# Patient Record
Sex: MALE | Race: WHITE | Employment: OTHER | URBAN - METROPOLITAN AREA
[De-identification: names, ages, dates, MRNs, and addresses within clinical notes are randomized per-mention and may not be internally consistent; named-entity substitution may affect disease eponyms.]

---

## 2017-01-04 ENCOUNTER — HOSPITAL ENCOUNTER (OUTPATIENT)
Dept: OTHER | Age: 82
Discharge: HOME | End: 2017-01-04
Attending: FAMILY MEDICINE | Admitting: FAMILY MEDICINE

## 2017-01-04 DIAGNOSIS — G89.29 CHRONIC MIDLINE THORACIC BACK PAIN: ICD-10-CM

## 2017-01-04 DIAGNOSIS — M76.892 ENTHESOPATHY OF HIP REGION ON BOTH SIDES: ICD-10-CM

## 2017-01-04 DIAGNOSIS — M54.9 CHRONIC NECK AND BACK PAIN: ICD-10-CM

## 2017-01-04 DIAGNOSIS — M54.6 CHRONIC MIDLINE THORACIC BACK PAIN: ICD-10-CM

## 2017-01-04 DIAGNOSIS — M54.2 CHRONIC NECK AND BACK PAIN: ICD-10-CM

## 2017-01-04 DIAGNOSIS — M16.10 PRIMARY LOCALIZED OSTEOARTHROSIS, PELVIC REGION AND THIGH: ICD-10-CM

## 2017-01-04 DIAGNOSIS — M54.50 CHRONIC MIDLINE LOW BACK PAIN WITHOUT SCIATICA: ICD-10-CM

## 2017-01-04 DIAGNOSIS — G89.29 CHRONIC MIDLINE LOW BACK PAIN WITHOUT SCIATICA: ICD-10-CM

## 2017-01-04 DIAGNOSIS — G89.29 CHRONIC NECK AND BACK PAIN: ICD-10-CM

## 2017-01-04 DIAGNOSIS — M76.891 ENTHESOPATHY OF HIP REGION ON BOTH SIDES: ICD-10-CM

## 2017-01-09 ENCOUNTER — CARE COORDINATION (OUTPATIENT)
Dept: CARE COORDINATION | Age: 82
End: 2017-01-09

## 2017-02-14 ENCOUNTER — OFFICE VISIT (OUTPATIENT)
Dept: FAMILY MEDICINE CLINIC | Age: 82
End: 2017-02-14

## 2017-02-14 ENCOUNTER — HOSPITAL ENCOUNTER (OUTPATIENT)
Dept: OTHER | Age: 82
Discharge: HOME | End: 2017-02-14
Attending: FAMILY MEDICINE | Admitting: FAMILY MEDICINE

## 2017-02-14 VITALS
BODY MASS INDEX: 30.75 KG/M2 | OXYGEN SATURATION: 97 % | SYSTOLIC BLOOD PRESSURE: 126 MMHG | DIASTOLIC BLOOD PRESSURE: 78 MMHG | HEIGHT: 72 IN | HEART RATE: 59 BPM | WEIGHT: 227 LBS

## 2017-02-14 DIAGNOSIS — R10.9 ACUTE RIGHT FLANK PAIN: Primary | ICD-10-CM
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
## 2017-02-14 DIAGNOSIS — Z93.3 S/P COLOSTOMY (HCC): ICD-10-CM

## 2017-02-14 DIAGNOSIS — R10.9 ACUTE RIGHT FLANK PAIN: ICD-10-CM

## 2017-02-14 DIAGNOSIS — K43.5 PARASTOMAL HERNIA WITHOUT OBSTRUCTION OR GANGRENE: ICD-10-CM

## 2017-02-14 PROCEDURE — 1123F ACP DISCUSS/DSCN MKR DOCD: CPT | Performed by: FAMILY MEDICINE

## 2017-02-14 PROCEDURE — G8417 CALC BMI ABV UP PARAM F/U: HCPCS | Performed by: FAMILY MEDICINE

## 2017-02-14 PROCEDURE — G8427 DOCREV CUR MEDS BY ELIG CLIN: HCPCS | Performed by: FAMILY MEDICINE

## 2017-02-14 PROCEDURE — 1036F TOBACCO NON-USER: CPT | Performed by: FAMILY MEDICINE

## 2017-02-14 PROCEDURE — G8484 FLU IMMUNIZE NO ADMIN: HCPCS | Performed by: FAMILY MEDICINE

## 2017-02-14 PROCEDURE — 4040F PNEUMOC VAC/ADMIN/RCVD: CPT | Performed by: FAMILY MEDICINE

## 2017-02-14 PROCEDURE — 99214 OFFICE O/P EST MOD 30 MIN: CPT | Performed by: FAMILY MEDICINE

## 2017-02-14 RX ORDER — FUROSEMIDE 40 MG/1
40 TABLET ORAL DAILY PRN
Qty: 90 TABLET | Refills: 2 | Status: SHIPPED | OUTPATIENT
Start: 2017-02-14 | End: 2017-12-13 | Stop reason: SDUPTHER

## 2017-02-21 ENCOUNTER — TELEPHONE (OUTPATIENT)
Dept: FAMILY MEDICINE CLINIC | Age: 82
End: 2017-02-21

## 2017-03-02 ENCOUNTER — HOSPITAL ENCOUNTER (OUTPATIENT)
Dept: CT IMAGING | Age: 82
Discharge: HOME | End: 2017-03-02
Attending: INTERNAL MEDICINE | Admitting: INTERNAL MEDICINE

## 2017-03-02 DIAGNOSIS — C78.01 SECONDARY MALIGNANCY OF RIGHT LUNG (HCC): ICD-10-CM

## 2017-03-02 DIAGNOSIS — C78.01 SECONDARY MALIGNANT NEOPLASM OF RIGHT LUNG (HCC): ICD-10-CM

## 2017-03-02 DIAGNOSIS — C20 RECTAL CARCINOMA (HCC): ICD-10-CM

## 2017-03-07 ENCOUNTER — CARE COORDINATION (OUTPATIENT)
Dept: CARE COORDINATION | Age: 82
End: 2017-03-07

## 2017-03-09 RX ORDER — FAMOTIDINE 20 MG/1
TABLET, FILM COATED ORAL
Qty: 180 TABLET | Refills: 1 | Status: SHIPPED | OUTPATIENT
Start: 2017-03-09 | End: 2017-08-30 | Stop reason: SDUPTHER

## 2017-04-19 ENCOUNTER — CARE COORDINATION (OUTPATIENT)
Dept: CARE COORDINATION | Age: 82
End: 2017-04-19

## 2017-08-30 RX ORDER — FAMOTIDINE 20 MG/1
TABLET, FILM COATED ORAL
Qty: 180 TABLET | Refills: 1 | Status: SHIPPED | OUTPATIENT
Start: 2017-08-30 | End: 2018-02-28 | Stop reason: SDUPTHER

## 2017-09-11 ENCOUNTER — OFFICE VISIT (OUTPATIENT)
Dept: FAMILY MEDICINE CLINIC | Age: 82
End: 2017-09-11

## 2017-09-11 VITALS
HEART RATE: 100 BPM | BODY MASS INDEX: 30.34 KG/M2 | HEIGHT: 72 IN | WEIGHT: 224 LBS | SYSTOLIC BLOOD PRESSURE: 126 MMHG | OXYGEN SATURATION: 94 % | DIASTOLIC BLOOD PRESSURE: 74 MMHG

## 2017-09-11 DIAGNOSIS — G62.0 CHEMOTHERAPY-INDUCED NEUROPATHY (HCC): ICD-10-CM

## 2017-09-11 DIAGNOSIS — Z23 FLU VACCINE NEED: ICD-10-CM

## 2017-09-11 DIAGNOSIS — T45.1X5A CHEMOTHERAPY-INDUCED NEUROPATHY (HCC): ICD-10-CM

## 2017-09-11 DIAGNOSIS — Z85.048 PERSONAL HISTORY OF RECTAL CANCER: ICD-10-CM

## 2017-09-11 DIAGNOSIS — R39.198 DIFFICULTY URINATING: ICD-10-CM

## 2017-09-11 DIAGNOSIS — I10 ESSENTIAL HYPERTENSION: ICD-10-CM

## 2017-09-11 DIAGNOSIS — N40.1 BENIGN PROSTATIC HYPERPLASIA WITH LOWER URINARY TRACT SYMPTOMS, UNSPECIFIED MORPHOLOGY: ICD-10-CM

## 2017-09-11 DIAGNOSIS — C91.10 CLL (CHRONIC LYMPHOCYTIC LEUKEMIA) (HCC): ICD-10-CM

## 2017-09-11 DIAGNOSIS — C78.01 SECONDARY MALIGNANCY OF RIGHT LUNG (HCC): ICD-10-CM

## 2017-09-11 DIAGNOSIS — Z01.818 PREOP EXAMINATION: Primary | ICD-10-CM

## 2017-09-11 PROCEDURE — G8417 CALC BMI ABV UP PARAM F/U: HCPCS | Performed by: FAMILY MEDICINE

## 2017-09-11 PROCEDURE — 90662 IIV NO PRSV INCREASED AG IM: CPT | Performed by: FAMILY MEDICINE

## 2017-09-11 PROCEDURE — 99214 OFFICE O/P EST MOD 30 MIN: CPT | Performed by: FAMILY MEDICINE

## 2017-09-11 PROCEDURE — 1123F ACP DISCUSS/DSCN MKR DOCD: CPT | Performed by: FAMILY MEDICINE

## 2017-09-11 PROCEDURE — 4040F PNEUMOC VAC/ADMIN/RCVD: CPT | Performed by: FAMILY MEDICINE

## 2017-09-11 PROCEDURE — G8427 DOCREV CUR MEDS BY ELIG CLIN: HCPCS | Performed by: FAMILY MEDICINE

## 2017-09-11 PROCEDURE — 1036F TOBACCO NON-USER: CPT | Performed by: FAMILY MEDICINE

## 2017-09-11 PROCEDURE — G0008 ADMIN INFLUENZA VIRUS VAC: HCPCS | Performed by: FAMILY MEDICINE

## 2017-09-11 PROCEDURE — 93000 ELECTROCARDIOGRAM COMPLETE: CPT | Performed by: FAMILY MEDICINE

## 2017-09-14 ENCOUNTER — HOSPITAL ENCOUNTER (OUTPATIENT)
Dept: CT IMAGING | Age: 82
Discharge: HOME | End: 2017-09-14
Attending: INTERNAL MEDICINE | Admitting: INTERNAL MEDICINE

## 2017-09-14 DIAGNOSIS — C78.01 SECONDARY MALIGNANCY OF RIGHT LUNG (HCC): ICD-10-CM

## 2017-09-14 DIAGNOSIS — C20 RECTAL CARCINOMA (HCC): ICD-10-CM

## 2017-09-14 DIAGNOSIS — C20 MALIGNANT NEOPLASM OF RECTUM (HCC): ICD-10-CM

## 2017-09-14 LAB
CREAT SERPL-MCNC: 0.9 MG/DL (ref 0.8–1.3)
GFR AFRICAN AMERICAN: >60
GFR NON-AFRICAN AMERICAN: >60

## 2017-09-21 ENCOUNTER — HOSPITAL ENCOUNTER (OUTPATIENT)
Dept: OTHER | Age: 82
Discharge: HOME | End: 2017-09-21
Attending: INTERNAL MEDICINE | Admitting: INTERNAL MEDICINE

## 2017-09-21 DIAGNOSIS — M25.551 RIGHT HIP PAIN: ICD-10-CM

## 2017-12-07 ENCOUNTER — OFFICE VISIT (OUTPATIENT)
Dept: FAMILY MEDICINE CLINIC | Age: 82
End: 2017-12-07

## 2017-12-07 VITALS
BODY MASS INDEX: 30.2 KG/M2 | HEART RATE: 59 BPM | DIASTOLIC BLOOD PRESSURE: 80 MMHG | HEIGHT: 72 IN | OXYGEN SATURATION: 93 % | WEIGHT: 223 LBS | SYSTOLIC BLOOD PRESSURE: 126 MMHG

## 2017-12-07 DIAGNOSIS — Z00.00 ROUTINE GENERAL MEDICAL EXAMINATION AT A HEALTH CARE FACILITY: Primary | ICD-10-CM

## 2017-12-07 DIAGNOSIS — M25.551 CHRONIC RIGHT HIP PAIN: ICD-10-CM

## 2017-12-07 DIAGNOSIS — M54.6 CHRONIC MIDLINE THORACIC BACK PAIN: ICD-10-CM

## 2017-12-07 DIAGNOSIS — G89.29 CHRONIC MIDLINE THORACIC BACK PAIN: ICD-10-CM

## 2017-12-07 DIAGNOSIS — G89.29 CHRONIC RIGHT HIP PAIN: ICD-10-CM

## 2017-12-07 DIAGNOSIS — G89.29 CHRONIC BILATERAL LOW BACK PAIN WITH BILATERAL SCIATICA: ICD-10-CM

## 2017-12-07 DIAGNOSIS — G89.29 CHRONIC NECK PAIN: ICD-10-CM

## 2017-12-07 DIAGNOSIS — M54.41 CHRONIC BILATERAL LOW BACK PAIN WITH BILATERAL SCIATICA: ICD-10-CM

## 2017-12-07 DIAGNOSIS — G89.29 CHRONIC LEFT HIP PAIN: ICD-10-CM

## 2017-12-07 DIAGNOSIS — M54.42 CHRONIC BILATERAL LOW BACK PAIN WITH BILATERAL SCIATICA: ICD-10-CM

## 2017-12-07 DIAGNOSIS — M54.2 CHRONIC NECK PAIN: ICD-10-CM

## 2017-12-07 DIAGNOSIS — M25.552 CHRONIC LEFT HIP PAIN: ICD-10-CM

## 2017-12-07 PROCEDURE — G0438 PPPS, INITIAL VISIT: HCPCS | Performed by: FAMILY MEDICINE

## 2017-12-07 ASSESSMENT — ANXIETY QUESTIONNAIRES: GAD7 TOTAL SCORE: 0

## 2017-12-07 ASSESSMENT — LIFESTYLE VARIABLES: HOW OFTEN DO YOU HAVE A DRINK CONTAINING ALCOHOL: 0

## 2017-12-07 ASSESSMENT — PATIENT HEALTH QUESTIONNAIRE - PHQ9: SUM OF ALL RESPONSES TO PHQ QUESTIONS 1-9: 0

## 2017-12-07 NOTE — PROGRESS NOTES
Medicare Annual Wellness Visit  Name: Tony Mccormick Date: 2017   MRN: [id number] Sex: Male   Age: 80 y.o. Ethnicity: Non-/Non    : 1928 Race: Kenyon Espinoza is here for Medicare AWV    Screenings for behavioral, psychosocial and functional/safety risks, and cognitive dysfunction are all negative except as indicated below. These results, as well as other patient data from the [address] form, are documented in Flowsheets linked to this Encounter. Allergies   Allergen Reactions    Adhesive Tape      Fragile skin paper tape only     Prior to Visit Medications    Medication Sig Taking? Authorizing Provider   famotidine (PEPCID) 20 MG tablet TAKE 1 TABLET BY MOUTH TWICE A DAY AS NEEDED Yes Gibson Cook CNP   furosemide (LASIX) 40 MG tablet Take 1 tablet by mouth daily as needed (edema) Yes Deonte Garzon MD   capecitabine (XELODA) 500 MG chemo tablet TAKE 3 TABLETS BY MOUTH TWICE DAILY FOR 14 DAYS ON FOLLOWED BY 7 DAYS OFF  Emelia Nissen, MD   prochlorperazine (COMPAZINE) 10 MG tablet Take 1 tablet by mouth every 6 hours as needed  Emelia Nissen, MD   Misc.  Devices (MICHAEL ROLLING [place]) MISC For use while walking  Deonte Garzon MD   Lift Chair MISC by Does not apply route  Deonte Garzon MD   ibuprofen (ADVIL;MOTRIN) 200 MG tablet Take 200 mg by mouth every 6 hours as needed for Pain   Historical Provider, MD     Past Medical History:   Diagnosis Date    Anemia     fe    Anesthesia     slow to wake up    Back pain, chronic     BPH     Carbon monoxide poisoning 2013    [facility]     [facility]) [facility]     Colon polyps 12/3/2007    tubular and adenomatous    Colostomy in place [facility])     Depression     Diverticulosis     GERD (gastroesophageal reflux disease)     Hematoma     [place] (hard of hearing)     Hypercholesterolemia     Osteoarthritis     Prolonged emergence from general anesthesia     Rectal cancer ([facility] [facility] 2012    Scoliosis     Spinal stenosis     Stress fracture of femoral neck 6/30/09    left, also stress fracture of femoral head    Urinary retention      Past Surgical History:   Procedure Laterality Date    BACK SURGERY  6/9/10    L3,4,5    BRAIN SURGERY  2013    hematoma from fall drained    COLONOSCOPY  1/14/11    COLONOSCOPY  7/30/12    polyp    COLONOSCOPY  9/13    no specimens    FINGER AMPUTATION      left index finger    HIP SURGERY  11-9-11    LEFT TOTAL HIP REPLACEMENT, ANTERIOR APPROACH WITH CELLSAVER  AND PLATELET GEL DEPUY    JOINT REPLACEMENT      LEFT HIP    LAPAROTOMY  11-    Exploratory laparotomy, lower sigmoid rectal and anal resection, appendectomy and creation of end colostomy    OTHER SURGICAL HISTORY  08/17/12    rectal EUS    OTHER SURGICAL HISTORY  8/29/2012    POWER PORT INSERTION    TURP       Family History   Problem Relation Age of Onset    Cancer Brother      esophageal    Cancer Brother      LUNG       CareTeam (Including outside providers/suppliers regularly involved in providing care):   Patient Care Team:  Magan Palomo MD as PCP - Elaine Casas MD as PCP - Hematology/Oncology (Medical Oncology)  Magan Palomo MD as PCP - S Attributed Provider  MD Crystal Gruber MD (Gastroenterology)  Tony Ontiveros MD as Consulting Physician (Urology)    Wt Readings from Last 3 Encounters:   12/07/17 223 lb (101.2 kg)   09/11/17 224 lb (101.6 kg)   07/05/17 227 lb (103 kg)     Vitals:    12/07/17 1249   BP: 126/80   Site: Right Arm   Position: Sitting   Cuff Size: Large Adult   Pulse: 59   SpO2: 93%   Weight: 223 lb (101.2 kg)   Height: 6' (1.829 m)           Patient's complete Health Risk Assessment and screening values have been reviewed and are found in Flowsheets.  The following problems were reviewed today and where indicated follow up appointments were made and/or referrals for the next 5-10 years is provided to the patient in written form: see Patient Instructions/AVS.

## 2017-12-12 ENCOUNTER — HOSPITAL ENCOUNTER (OUTPATIENT)
Dept: OTHER | Age: 82
Discharge: HOME | End: 2017-12-12
Attending: FAMILY MEDICINE | Admitting: FAMILY MEDICINE

## 2017-12-12 DIAGNOSIS — G89.29 CHRONIC LEFT HIP PAIN: ICD-10-CM

## 2017-12-12 DIAGNOSIS — M54.42 CHRONIC BILATERAL LOW BACK PAIN WITH BILATERAL SCIATICA: ICD-10-CM

## 2017-12-12 DIAGNOSIS — G89.29 CHRONIC BILATERAL LOW BACK PAIN WITH BILATERAL SCIATICA: ICD-10-CM

## 2017-12-12 DIAGNOSIS — M54.2 CHRONIC NECK PAIN: ICD-10-CM

## 2017-12-12 DIAGNOSIS — G89.29 CHRONIC MIDLINE THORACIC BACK PAIN: ICD-10-CM

## 2017-12-12 DIAGNOSIS — M54.41 CHRONIC BILATERAL LOW BACK PAIN WITH BILATERAL SCIATICA: ICD-10-CM

## 2017-12-12 DIAGNOSIS — G89.29 CHRONIC RIGHT HIP PAIN: ICD-10-CM

## 2017-12-12 DIAGNOSIS — M54.6 CHRONIC MIDLINE THORACIC BACK PAIN: ICD-10-CM

## 2017-12-12 DIAGNOSIS — M25.551 CHRONIC RIGHT HIP PAIN: ICD-10-CM

## 2017-12-12 DIAGNOSIS — M25.552 CHRONIC LEFT HIP PAIN: ICD-10-CM

## 2017-12-12 DIAGNOSIS — G89.29 CHRONIC NECK PAIN: ICD-10-CM

## 2017-12-14 RX ORDER — FUROSEMIDE 40 MG/1
40 TABLET ORAL DAILY PRN
Qty: 90 TABLET | Refills: 2 | Status: SHIPPED | OUTPATIENT
Start: 2017-12-14 | End: 2019-08-30 | Stop reason: SDUPTHER

## 2018-02-28 RX ORDER — FAMOTIDINE 20 MG/1
TABLET, FILM COATED ORAL
Qty: 180 TABLET | Refills: 1 | Status: SHIPPED | OUTPATIENT
Start: 2018-02-28 | End: 2018-09-14 | Stop reason: SDUPTHER

## 2018-03-16 ENCOUNTER — OFFICE VISIT (OUTPATIENT)
Dept: FAMILY MEDICINE CLINIC | Age: 83
End: 2018-03-16

## 2018-03-16 VITALS
HEIGHT: 72 IN | BODY MASS INDEX: 30.34 KG/M2 | WEIGHT: 224 LBS | OXYGEN SATURATION: 98 % | SYSTOLIC BLOOD PRESSURE: 122 MMHG | TEMPERATURE: 98.3 F | DIASTOLIC BLOOD PRESSURE: 70 MMHG | HEART RATE: 64 BPM

## 2018-03-16 DIAGNOSIS — G62.0 CHEMOTHERAPY-INDUCED NEUROPATHY (HCC): ICD-10-CM

## 2018-03-16 DIAGNOSIS — T45.1X5A CHEMOTHERAPY-INDUCED NEUROPATHY (HCC): ICD-10-CM

## 2018-03-16 DIAGNOSIS — Z93.3 S/P COLOSTOMY (HCC): ICD-10-CM

## 2018-03-16 DIAGNOSIS — J06.9 VIRAL URI: Primary | ICD-10-CM

## 2018-03-16 PROCEDURE — G8417 CALC BMI ABV UP PARAM F/U: HCPCS | Performed by: FAMILY MEDICINE

## 2018-03-16 PROCEDURE — 1123F ACP DISCUSS/DSCN MKR DOCD: CPT | Performed by: FAMILY MEDICINE

## 2018-03-16 PROCEDURE — 4040F PNEUMOC VAC/ADMIN/RCVD: CPT | Performed by: FAMILY MEDICINE

## 2018-03-16 PROCEDURE — 99213 OFFICE O/P EST LOW 20 MIN: CPT | Performed by: FAMILY MEDICINE

## 2018-03-16 PROCEDURE — G8427 DOCREV CUR MEDS BY ELIG CLIN: HCPCS | Performed by: FAMILY MEDICINE

## 2018-03-16 PROCEDURE — 1036F TOBACCO NON-USER: CPT | Performed by: FAMILY MEDICINE

## 2018-03-16 PROCEDURE — G8482 FLU IMMUNIZE ORDER/ADMIN: HCPCS | Performed by: FAMILY MEDICINE

## 2018-03-16 RX ORDER — GUAIFENESIN DEXTROMETHORPHAN HYDROBROMIDE ORAL SOLUTION 10; 100 MG/5ML; MG/5ML
10 SOLUTION ORAL EVERY 4 HOURS PRN
Qty: 1 BOTTLE | Refills: 0 | COMMUNITY
Start: 2018-03-16 | End: 2019-06-13

## 2018-03-16 NOTE — PROGRESS NOTES
Colostomy present. No complications. Continue current stoma care.   Colostomy Visit Dx:  Status post colostomy [facility])  Last edited 03/17/18 16:13 EDT by Amy Cuellar MD

## 2018-03-27 ENCOUNTER — HOSPITAL ENCOUNTER (OUTPATIENT)
Dept: CT IMAGING | Age: 83
Discharge: HOME | End: 2018-03-27
Attending: INTERNAL MEDICINE | Admitting: INTERNAL MEDICINE

## 2018-03-27 DIAGNOSIS — C78.01 SECONDARY MALIGNANT NEOPLASM OF RIGHT LUNG (HCC): ICD-10-CM

## 2018-04-23 ENCOUNTER — TELEPHONE (OUTPATIENT)
Dept: FAMILY MEDICINE CLINIC | Age: 83
End: 2018-04-23

## 2018-04-23 DIAGNOSIS — M16.10 OSTEOARTHRITIS OF PELVIC REGION: ICD-10-CM

## 2018-04-23 DIAGNOSIS — M76.891 ENTHESOPATHY OF HIP REGION ON BOTH SIDES: ICD-10-CM

## 2018-04-23 DIAGNOSIS — G89.29 CHRONIC MIDLINE THORACIC BACK PAIN: ICD-10-CM

## 2018-04-23 DIAGNOSIS — T45.1X5A CHEMOTHERAPY-INDUCED NEUROPATHY (HCC): ICD-10-CM

## 2018-04-23 DIAGNOSIS — M17.10 PRIMARY LOCALIZED OSTEOARTHROSIS OF LOWER LEG, UNSPECIFIED LATERALITY: ICD-10-CM

## 2018-04-23 DIAGNOSIS — M48.061 SPINAL STENOSIS OF LUMBAR REGION, UNSPECIFIED WHETHER NEUROGENIC CLAUDICATION PRESENT: ICD-10-CM

## 2018-04-23 DIAGNOSIS — M76.892 ENTHESOPATHY OF HIP REGION ON BOTH SIDES: ICD-10-CM

## 2018-04-23 DIAGNOSIS — M25.552 LEFT HIP PAIN: ICD-10-CM

## 2018-04-23 DIAGNOSIS — R29.898 BILATERAL LEG WEAKNESS: Primary | ICD-10-CM

## 2018-04-23 DIAGNOSIS — M54.6 CHRONIC MIDLINE THORACIC BACK PAIN: ICD-10-CM

## 2018-04-23 DIAGNOSIS — G62.0 CHEMOTHERAPY-INDUCED NEUROPATHY (HCC): ICD-10-CM

## 2018-05-09 ENCOUNTER — HOSPITAL ENCOUNTER (OUTPATIENT)
Dept: PHYSICAL THERAPY | Age: 83
Discharge: HOME | End: 2018-05-31
Admitting: FAMILY MEDICINE

## 2018-05-14 ENCOUNTER — HOSPITAL ENCOUNTER (OUTPATIENT)
Dept: PHYSICAL THERAPY | Age: 83
Discharge: HOME | End: 2018-05-15
Admitting: FAMILY MEDICINE

## 2018-05-16 ENCOUNTER — HOSPITAL ENCOUNTER (OUTPATIENT)
Dept: PHYSICAL THERAPY | Age: 83
Discharge: HOME | End: 2018-05-17
Admitting: FAMILY MEDICINE

## 2018-05-21 ENCOUNTER — HOSPITAL ENCOUNTER (OUTPATIENT)
Dept: PHYSICAL THERAPY | Age: 83
Discharge: HOME | End: 2018-05-22
Admitting: FAMILY MEDICINE

## 2018-05-23 ENCOUNTER — HOSPITAL ENCOUNTER (OUTPATIENT)
Dept: PHYSICAL THERAPY | Age: 83
Discharge: HOME | End: 2018-05-24
Admitting: FAMILY MEDICINE

## 2018-05-30 ENCOUNTER — HOSPITAL ENCOUNTER (OUTPATIENT)
Dept: PHYSICAL THERAPY | Age: 83
Discharge: HOME | End: 2018-06-30
Admitting: FAMILY MEDICINE

## 2018-06-01 ENCOUNTER — HOSPITAL ENCOUNTER (OUTPATIENT)
Dept: OTHER | Age: 83
Discharge: HOME | End: 2018-06-01
Attending: FAMILY MEDICINE | Admitting: FAMILY MEDICINE

## 2018-06-04 ENCOUNTER — HOSPITAL ENCOUNTER (OUTPATIENT)
Dept: PHYSICAL THERAPY | Age: 83
Discharge: HOME | End: 2018-06-05
Admitting: FAMILY MEDICINE

## 2018-06-11 ENCOUNTER — HOSPITAL ENCOUNTER (OUTPATIENT)
Dept: PHYSICAL THERAPY | Age: 83
Discharge: HOME | End: 2018-06-12
Admitting: FAMILY MEDICINE

## 2018-06-25 ENCOUNTER — HOSPITAL ENCOUNTER (OUTPATIENT)
Dept: PHYSICAL THERAPY | Age: 83
Discharge: HOME | End: 2018-06-26
Admitting: FAMILY MEDICINE

## 2018-06-27 ENCOUNTER — HOSPITAL ENCOUNTER (OUTPATIENT)
Dept: PHYSICAL THERAPY | Age: 83
Discharge: HOME | End: 2018-06-28
Admitting: FAMILY MEDICINE

## 2018-07-01 ENCOUNTER — HOSPITAL ENCOUNTER (OUTPATIENT)
Dept: OTHER | Age: 83
Discharge: HOME | End: 2018-07-01
Attending: FAMILY MEDICINE | Admitting: FAMILY MEDICINE

## 2018-07-02 ENCOUNTER — HOSPITAL ENCOUNTER (OUTPATIENT)
Dept: PHYSICAL THERAPY | Age: 83
Discharge: HOME | End: 2018-07-03
Admitting: FAMILY MEDICINE

## 2018-07-02 NOTE — PROGRESS NOTES
Therapeutic Exercise:   15 min         Group Therapy:      Home Exercise Program:      Therapeutic Activity:   min    Neuromuscular Re-education:   15  min balance activities. Gait:    min. ( has to lean on walker or his LB,hips, and knees start to hurt)    Manual Therapy:    30 min. Blue table:      R knee distraction at  20,. Supine  lumbar distration- needed towel pad under leg strap, some relief. R leg distraction,  mobiliz with movememnt L3-4  L4-5  L5-S1 x3 each segment. R hip- knee to chest. Resisted knee to chest, resisted IR/ER, resisted add/abd- all improved knee flx and groin pain. R hip distraction. Will cont with manual techniuqes for pain relief if he thinks they are helpful. May try mobiliz with movement for lumbar spine. Modalities:      Timed Code Treatment Minutes:  60    Total Treatment Minutes:      61    Medicare Cap Total YTD:    1216.00    Treatment/Activity Tolerance:    [x] Patient tolerated treatment well [] Patient limited by fatigue   [x] Patient limited by pain- R LB, R hip [] Patient limited by other medical complications   [] Other:     Prognosis: [x] Good [x] Fair  [] Poor    Patient Requires Follow-up:  [x] Yes  [] No    Plan: [x] Continue per plan of care [] Alter current plan (see comments)   [] Plan of care initiated [] Hold pending MD visit [] Discharge    Plan for Next Session:  Used   wide table- hip ex, hip distraction, knee distrctiion,  Manually resissted R hip ex, mobiliz with movement. .    See Progress Note: [] Yes  [x] No       Next due:      At d/c     Electronically signed by:  Lincoln Tamayo [address] [place] [address]      Outpatient Physical Therapy  Progress Note        Progress Note covers period from:    5//9/18  To 6/6/18      Subjective:  Reports he has improved just a little. He feels his balance is a little better. He is able to get up from a chair more  easily. Objective:  Observation:  Test measurements:   Ankles have improved 1/3

## 2018-07-11 ENCOUNTER — HOSPITAL ENCOUNTER (OUTPATIENT)
Dept: PHYSICAL THERAPY | Age: 83
Discharge: HOME | End: 2018-07-12
Admitting: FAMILY MEDICINE

## 2018-08-13 ENCOUNTER — TELEPHONE (OUTPATIENT)
Dept: FAMILY MEDICINE CLINIC | Age: 83
End: 2018-08-13

## 2018-08-14 ENCOUNTER — TELEPHONE (OUTPATIENT)
Dept: FAMILY MEDICINE CLINIC | Age: 83
End: 2018-08-14

## 2018-08-14 NOTE — TELEPHONE ENCOUNTER
Pt's daughter, Howard Dozier (not on hipaa), but verbal consent from pt given to speak with her, called stating pt fell on Sunday and scraped his left elbow open. States  [facility] told her she wanted to see patient. Nothing available today. Offered appointment for Thursday and Howard Sol would rather get him in sooner. Scheduled appointment for tomorrow 8/15/18 at 3:00 pm with Milka (offered morning appt as well). Is it ok for pt to be seen tomorrow by Leeann Nolan? Howard Dozier requesting call back to let her know if Dr. [facility] approves.

## 2018-08-15 ENCOUNTER — OFFICE VISIT (OUTPATIENT)
Dept: FAMILY MEDICINE CLINIC | Age: 83
End: 2018-08-15

## 2018-08-15 VITALS
WEIGHT: 218 LBS | HEART RATE: 57 BPM | BODY MASS INDEX: 29.57 KG/M2 | OXYGEN SATURATION: 97 % | SYSTOLIC BLOOD PRESSURE: 118 MMHG | DIASTOLIC BLOOD PRESSURE: 70 MMHG

## 2018-08-15 DIAGNOSIS — S51.012A SKIN TEAR OF LEFT ELBOW WITHOUT COMPLICATION, INITIAL ENCOUNTER: Primary | ICD-10-CM

## 2018-08-15 PROCEDURE — 1036F TOBACCO NON-USER: CPT | Performed by: PHYSICIAN ASSISTANT

## 2018-08-15 PROCEDURE — 1101F PT FALLS ASSESS-DOCD LE1/YR: CPT | Performed by: PHYSICIAN ASSISTANT

## 2018-08-15 PROCEDURE — 1123F ACP DISCUSS/DSCN MKR DOCD: CPT | Performed by: PHYSICIAN ASSISTANT

## 2018-08-15 PROCEDURE — 4040F PNEUMOC VAC/ADMIN/RCVD: CPT | Performed by: PHYSICIAN ASSISTANT

## 2018-08-15 PROCEDURE — G8417 CALC BMI ABV UP PARAM F/U: HCPCS | Performed by: PHYSICIAN ASSISTANT

## 2018-08-15 PROCEDURE — 99213 OFFICE O/P EST LOW 20 MIN: CPT | Performed by: PHYSICIAN ASSISTANT

## 2018-08-15 PROCEDURE — G8427 DOCREV CUR MEDS BY ELIG CLIN: HCPCS | Performed by: PHYSICIAN ASSISTANT

## 2018-08-15 ASSESSMENT — ENCOUNTER SYMPTOMS
COLOR CHANGE: 0
SHORTNESS OF BREATH: 0

## 2018-08-15 NOTE — PROGRESS NOTES
8/15/2018  Hamlet Jose (: 1928)  80 y.o. HPI  The patient is here for evaluation of skin tear.  he tripped on carpet and fell at home, scraping his left arm on the floor. He denies any trauma to his head. The wound drained clear serous fluid for one day. His daughter has been changing the dressing daily with non-stick pad, Vaseline, and wrapping in guaze bandage. They are irrigating the wound daily with normal saline. He denies any fevers, warmth around the skin, or current drainage from the wound. The patient is not on blood thinners. Review of Systems   Constitutional: Negative for chills, fatigue and fever. Respiratory: Negative for shortness of breath. Cardiovascular: Negative for chest pain. Musculoskeletal: Negative for joint swelling. Skin: Positive for wound. Negative for color change. Neurological: Negative for dizziness, weakness, light-headedness and headaches. Hematological: Bruises/bleeds easily.        Past Medical History:   Diagnosis Date    Anemia     fe    Anesthesia     slow to wake up    Back pain, chronic     BPH     Carbon monoxide poisoning 2013    [facility])     CLL (chronic lymphocytic leukemia) ([facility] [address] [facility]     Colon polyps 12/3/2007    tubular and adenomatous    Colostomy in place [facility])     Depression     Diverticulosis     GERD (gastroesophageal reflux disease)     Hematoma     [place] (hard of hearing)     Hypercholesterolemia     Osteoarthritis     Prolonged emergence from general anesthesia     Rectal cancer ([facility] [address]     Scoliosis     Spinal stenosis     Stress fracture of femoral neck 09    left, also stress fracture of femoral head    Stress fracture of femoral neck 2009    left, also stress fracture of femoral head    Urinary retention      Past Surgical History:   Procedure Laterality Date    BACK SURGERY  6/9/10    L3,4,5    BRAIN SURGERY      hematoma from fall drained    while walking 1 each 0    Lift Chair MISC by Does not apply route 1 each 0    ibuprofen (ADVIL;MOTRIN) 200 MG tablet Take 200 mg by mouth every 6 hours as needed for Pain        No current facility-administered medications for this visit. Vitals:    08/15/18 1445   BP: 118/70   Site: Right Arm   Position: Sitting   Cuff Size: Large Adult   Pulse: 57   SpO2: 97%   Weight: 218 lb (98.9 kg)     Estimated body mass index is 29.57 kg/m² as calculated from the following:    Height as of 3/16/18: 6' (1.829 m). Weight as of this encounter: 218 lb (98.9 kg). Physical Exam   Constitutional: He is oriented to person, place, and time. He appears well-developed and well-nourished. Cardiovascular: Normal rate and normal heart sounds. No murmur heard. Pulmonary/Chest: Effort normal and breath sounds normal. No respiratory distress. He has no wheezes. Neurological: He is alert and oriented to person, place, and time. Skin: No erythema. Two large skin tears on dorsum of left upper arm and forearm. No warmth or erythema surrounding the tears. Small area of dead skin on upper arm, otherwise, no loose skin needing debridement. Small amount of blood draining from lower skin tear. No exudate. Please see photo below. Vitals reviewed. Media Information        Document Information     Wound   Three days post fall   08/15/2018 15:04   Attached To: Office Visit on 8/15/18 with GUSTAVO Zamora   Source Information     GUSTAVO Zamora  [facility]       ASSESSMENT and PLAN:  Danial Rain was seen today for fall. Diagnoses and all orders for this visit:    Skin tear of left elbow without complication, initial encounter      -Patient with significant skin tear to left elbow. Debrided small 1.5 cm piece of skin flap on upper arm. No signs of infection at this time. Continue with daily dressings and irrigation with saline water.  Patient to have some time throughout the day without dressing so he does not macerate the surrounding skin and allows oxygen to the wound. Patient is unable to dress this wound himself and will require home health care to assist with wound care and evaluation. He has limited mobility and ambulates with walker making it difficult for him to have regular wound care appointments in the community.

## 2018-09-14 RX ORDER — FAMOTIDINE 20 MG/1
TABLET, FILM COATED ORAL
Qty: 180 TABLET | Refills: 1 | Status: SHIPPED | OUTPATIENT
Start: 2018-09-14 | End: 2019-10-01 | Stop reason: SDUPTHER

## 2019-03-15 ENCOUNTER — OFFICE VISIT (OUTPATIENT)
Dept: FAMILY MEDICINE CLINIC | Age: 84
End: 2019-03-15
Payer: MEDICARE

## 2019-03-15 VITALS
WEIGHT: 205.2 LBS | OXYGEN SATURATION: 97 % | BODY MASS INDEX: 27.83 KG/M2 | SYSTOLIC BLOOD PRESSURE: 100 MMHG | DIASTOLIC BLOOD PRESSURE: 58 MMHG | HEART RATE: 59 BPM
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
## 2019-03-15 DIAGNOSIS — R42 DIZZINESS: Primary | ICD-10-CM

## 2019-03-15 DIAGNOSIS — R26.89 IMBALANCE: ICD-10-CM

## 2019-03-15 DIAGNOSIS — I95.1 ORTHOSTATIC HYPOTENSION: ICD-10-CM

## 2019-03-15 LAB
BILIRUBIN, POC: NORMAL
BLOOD URINE, POC: NORMAL
CLARITY, POC: CLEAR
COLOR, POC: YELLOW
GLUCOSE URINE, POC: NORMAL
HCT VFR BLD CALC: 39.3 % (ref 40.5–52.5)
HEMOGLOBIN: 12.9 G/DL (ref 13.5–17.5)
KETONES, POC: NORMAL
LEUKOCYTE EST, POC: NORMAL
MCH RBC QN AUTO: 30.3 PG (ref 26–34)
MCHC RBC AUTO-ENTMCNC: 32.8 G/DL (ref 31–36)
MCV RBC AUTO: 92.3 FL (ref 80–100)
NITRITE, POC: NORMAL
PDW BLD-RTO: 16.4 % (ref 12.4–15.4)
PH, POC: 6
PLATELET # BLD: 269 K/UL (ref 135–450)
PMV BLD AUTO: 7.8 FL (ref 5–10.5)
PROTEIN, POC: NORMAL
RBC # BLD: 4.26 M/UL (ref 4.2–5.9)
SPECIFIC GRAVITY, POC: 1.01
UROBILINOGEN, POC: NORMAL
WBC # BLD: 9.4 K/UL (ref 4–11)

## 2019-03-15 PROCEDURE — G8427 DOCREV CUR MEDS BY ELIG CLIN: HCPCS | Performed by: PHYSICIAN ASSISTANT

## 2019-03-15 PROCEDURE — 1123F ACP DISCUSS/DSCN MKR DOCD: CPT | Performed by: PHYSICIAN ASSISTANT

## 2019-03-15 PROCEDURE — 1036F TOBACCO NON-USER: CPT | Performed by: PHYSICIAN ASSISTANT

## 2019-03-15 PROCEDURE — G8417 CALC BMI ABV UP PARAM F/U: HCPCS | Performed by: PHYSICIAN ASSISTANT

## 2019-03-15 PROCEDURE — 36415 COLL VENOUS BLD VENIPUNCTURE: CPT | Performed by: PHYSICIAN ASSISTANT

## 2019-03-15 PROCEDURE — 1101F PT FALLS ASSESS-DOCD LE1/YR: CPT | Performed by: PHYSICIAN ASSISTANT

## 2019-03-15 PROCEDURE — 81002 URINALYSIS NONAUTO W/O SCOPE: CPT | Performed by: PHYSICIAN ASSISTANT

## 2019-03-15 PROCEDURE — 99213 OFFICE O/P EST LOW 20 MIN: CPT | Performed by: PHYSICIAN ASSISTANT

## 2019-03-15 PROCEDURE — 4040F PNEUMOC VAC/ADMIN/RCVD: CPT | Performed by: PHYSICIAN ASSISTANT

## 2019-03-15 PROCEDURE — G8484 FLU IMMUNIZE NO ADMIN: HCPCS | Performed by: PHYSICIAN ASSISTANT

## 2019-03-15 RX ORDER — CITALOPRAM 10 MG/1
10 TABLET ORAL DAILY
COMMUNITY
End: 2020-01-01 | Stop reason: SDUPTHER

## 2019-03-15 RX ORDER — LORATADINE 10 MG/1
10 TABLET ORAL DAILY
COMMUNITY

## 2019-03-15 ASSESSMENT — PATIENT HEALTH QUESTIONNAIRE - PHQ9
2. FEELING DOWN, DEPRESSED OR HOPELESS: 0
SUM OF ALL RESPONSES TO PHQ QUESTIONS 1-9: 1
SUM OF ALL RESPONSES TO PHQ QUESTIONS 1-9: 1
1. LITTLE INTEREST OR PLEASURE IN DOING THINGS: 1
SUM OF ALL RESPONSES TO PHQ9 QUESTIONS 1 & 2: 1

## 2019-03-15 ASSESSMENT — ENCOUNTER SYMPTOMS
RHINORRHEA: 0
DIARRHEA: 0
NAUSEA: 0
ABDOMINAL PAIN: 0
SHORTNESS OF BREATH: 0
VOMITING: 0
BACK PAIN: 1
SORE THROAT: 0
CONSTIPATION: 0
COUGH: 0

## 2019-03-16 LAB
A/G RATIO: 2.1 (ref 1.1–2.2)
ALBUMIN SERPL-MCNC: 4.2 G/DL (ref 3.4–5)
ALP BLD-CCNC: 109 U/L (ref 40–129)
ALT SERPL-CCNC: 10 U/L (ref 10–40)
ANION GAP SERPL CALCULATED.3IONS-SCNC: 12 MMOL/L (ref 3–16)
AST SERPL-CCNC: 17 U/L (ref 15–37)
BILIRUB SERPL-MCNC: 0.4 MG/DL (ref 0–1)
BUN BLDV-MCNC: 21 MG/DL (ref 7–20)
CALCIUM SERPL-MCNC: 9.4 MG/DL (ref 8.3–10.6)
CHLORIDE BLD-SCNC: 99 MMOL/L (ref 99–110)
CO2: 31 MMOL/L (ref 21–32)
CREAT SERPL-MCNC: 0.8 MG/DL (ref 0.8–1.3)
GFR AFRICAN AMERICAN: >60
GFR NON-AFRICAN AMERICAN: >60
GLOBULIN: 2 G/DL
GLUCOSE BLD-MCNC: 89 MG/DL (ref 70–99)
POTASSIUM SERPL-SCNC: 3.9 MMOL/L (ref 3.5–5.1)
SODIUM BLD-SCNC: 142 MMOL/L (ref 136–145)
TOTAL PROTEIN: 6.2 G/DL (ref 6.4–8.2)

## 2019-03-27 ENCOUNTER — TELEPHONE (OUTPATIENT)
Dept: FAMILY MEDICINE CLINIC | Age: 84
End: 2019-03-27

## 2019-04-08 ENCOUNTER — TELEPHONE (OUTPATIENT)
Dept: FAMILY MEDICINE CLINIC | Age: 84
End: 2019-04-08

## 2019-04-08 DIAGNOSIS — D64.9 ANEMIA, UNSPECIFIED TYPE: Primary | ICD-10-CM

## 2019-04-08 NOTE — TELEPHONE ENCOUNTER
Spoke with patient's daughter regarding recent lab work. Pt with some improvement in dizziness since last appointment. New anemia on labs, recommend repeat CBC in a week. If stable can continue to monitor. If continues to drop would recommend follow up with Dr. Samm Iraheta. Pt has colostomy w/w to history of rectal carcinoma. Patient's daughter agreeable.

## 2019-06-04 ENCOUNTER — OFFICE VISIT (OUTPATIENT)
Dept: FAMILY MEDICINE CLINIC | Age: 84
End: 2019-06-04
Payer: MEDICARE

## 2019-06-04 VITALS
OXYGEN SATURATION: 97 % | HEIGHT: 72 IN | DIASTOLIC BLOOD PRESSURE: 78 MMHG | SYSTOLIC BLOOD PRESSURE: 132 MMHG | BODY MASS INDEX: 28.71 KG/M2 | WEIGHT: 212 LBS | HEART RATE: 55 BPM

## 2019-06-04 DIAGNOSIS — T45.1X5A CHEMOTHERAPY-INDUCED NEUROPATHY (HCC): ICD-10-CM

## 2019-06-04 DIAGNOSIS — D64.9 NORMOCYTIC ANEMIA: ICD-10-CM

## 2019-06-04 DIAGNOSIS — H02.843 SWELLING OF EYELID, RIGHT: ICD-10-CM

## 2019-06-04 DIAGNOSIS — H25.9 AGE-RELATED CATARACT OF BOTH EYES, UNSPECIFIED AGE-RELATED CATARACT TYPE: ICD-10-CM

## 2019-06-04 DIAGNOSIS — G62.0 CHEMOTHERAPY-INDUCED NEUROPATHY (HCC): ICD-10-CM

## 2019-06-04 DIAGNOSIS — Z23 NEED FOR PROPHYLACTIC VACCINATION AND INOCULATION AGAINST VARICELLA: ICD-10-CM

## 2019-06-04 DIAGNOSIS — Z01.818 PREOP EXAMINATION: ICD-10-CM

## 2019-06-04 DIAGNOSIS — Z01.818 PREOP EXAMINATION: Primary | ICD-10-CM

## 2019-06-04 DIAGNOSIS — I10 ESSENTIAL HYPERTENSION: ICD-10-CM

## 2019-06-04 DIAGNOSIS — C91.10 CLL (CHRONIC LYMPHOCYTIC LEUKEMIA) (HCC): ICD-10-CM

## 2019-06-04 DIAGNOSIS — S68.111A AMPUTATION OF LEFT INDEX FINGER: ICD-10-CM

## 2019-06-04 DIAGNOSIS — Z93.3 S/P COLOSTOMY (HCC): ICD-10-CM

## 2019-06-04 DIAGNOSIS — C20 RECTAL CARCINOMA (HCC): ICD-10-CM

## 2019-06-04 LAB
BASOPHILS ABSOLUTE: 0 K/UL (ref 0–0.2)
BASOPHILS RELATIVE PERCENT: 0.4 %
EOSINOPHILS ABSOLUTE: 0.1 K/UL (ref 0–0.6)
EOSINOPHILS RELATIVE PERCENT: 1.8 %
FOLATE: 16.58 NG/ML (ref 4.78–24.2)
HCT VFR BLD CALC: 39.3 % (ref 40.5–52.5)
HEMOGLOBIN: 13 G/DL (ref 13.5–17.5)
LYMPHOCYTES ABSOLUTE: 2.7 K/UL (ref 1–5.1)
LYMPHOCYTES RELATIVE PERCENT: 38.3 %
MCH RBC QN AUTO: 31.7 PG (ref 26–34)
MCHC RBC AUTO-ENTMCNC: 33 G/DL (ref 31–36)
MCV RBC AUTO: 96.1 FL (ref 80–100)
MONOCYTES ABSOLUTE: 0.4 K/UL (ref 0–1.3)
MONOCYTES RELATIVE PERCENT: 5.7 %
NEUTROPHILS ABSOLUTE: 3.8 K/UL (ref 1.7–7.7)
NEUTROPHILS RELATIVE PERCENT: 53.8 %
PDW BLD-RTO: 15.2 % (ref 12.4–15.4)
PLATELET # BLD: 200 K/UL (ref 135–450)
PMV BLD AUTO: 7.9 FL (ref 5–10.5)
RBC # BLD: 4.09 M/UL (ref 4.2–5.9)
VITAMIN B-12: 562 PG/ML (ref 211–911)
WBC # BLD: 7.1 K/UL (ref 4–11)

## 2019-06-04 PROCEDURE — 1036F TOBACCO NON-USER: CPT | Performed by: FAMILY MEDICINE

## 2019-06-04 PROCEDURE — 93000 ELECTROCARDIOGRAM COMPLETE: CPT | Performed by: FAMILY MEDICINE

## 2019-06-04 PROCEDURE — G8427 DOCREV CUR MEDS BY ELIG CLIN: HCPCS | Performed by: FAMILY MEDICINE

## 2019-06-04 PROCEDURE — 4040F PNEUMOC VAC/ADMIN/RCVD: CPT | Performed by: FAMILY MEDICINE

## 2019-06-04 PROCEDURE — G8417 CALC BMI ABV UP PARAM F/U: HCPCS | Performed by: FAMILY MEDICINE

## 2019-06-04 PROCEDURE — 1123F ACP DISCUSS/DSCN MKR DOCD: CPT | Performed by: FAMILY MEDICINE

## 2019-06-04 PROCEDURE — 99214 OFFICE O/P EST MOD 30 MIN: CPT | Performed by: FAMILY MEDICINE

## 2019-06-04 RX ORDER — OLOPATADINE HYDROCHLORIDE 1 MG/ML
1 SOLUTION/ DROPS OPHTHALMIC 2 TIMES DAILY
Qty: 1 BOTTLE | Refills: 0 | Status: SHIPPED | OUTPATIENT
Start: 2019-06-04 | End: 2019-07-04

## 2019-06-04 NOTE — PROGRESS NOTES
Preoperative Consultation      Chen Diop  YOB: 1928    Date of Service:  6/4/2019    Vitals:    06/04/19 1525   BP: 132/78   Site: Right Upper Arm   Position: Sitting   Cuff Size: Small Adult   Pulse: 55   SpO2: 97%   Weight: 212 lb (96.2 kg)   Height: 6' (1.829 m)      Wt Readings from Last 2 Encounters:   06/04/19 212 lb (96.2 kg)   03/15/19 205 lb 3.2 oz (93.1 kg)     BP Readings from Last 3 Encounters:   06/04/19 132/78   03/15/19 (!) 100/58   08/15/18 118/70        Chief Complaint   Patient presents with    Pre-op Exam     6/17/19 right eye, 6/24/19 left eye, Dr. Marguerite Cuadra, [facility]      Allergies   Allergen Reactions    Adhesive Tape      Fragile skin paper tape only     No outpatient medications have been marked as taking for the 6/4/19 encounter (Office Visit) with Geoffrey Cheek MD.       This patient presents to the office today for a preoperative consultation at the request of surgeon,  June 17, who plans on performing bilateral cataract surgery on June 17 for the right eye and June 24 for the left eye at [address] [place] [address].  The current problem began 4 years ago, and symptoms have been worsening with time. Conservative therapy: N/A.     Planned anesthesia: IV sedation   Known anesthesia problems: None   Bleeding risk: No recent or remote history of abnormal bleeding  Personal or FH of DVT/PE: No    Patient objection to receiving blood products: No    Patient Active Problem List   Diagnosis    GERD (gastroesophageal reflux disease)    Benign prostatic hyperplasia    Osteoarthritis    Hypercholesterolemia    CLL (chronic lymphocytic leukemia) ([address]    Scoliosis    Diverticulosis    Lumbar spinal stenosis    Hypertension    Chronic back pain    Left hip pain    Heartburn    Elevated glucose    S/P colostomy ([address]    Enthesopathy of hip region    Primary localized osteoarthrosis, lower leg    Primary localized osteoarthrosis, pelvic region and thigh    Chemotherapy-induced neuropathy ([address]    Personal history of rectal cancer    Secondary malignancy of right lung ([address]    Rectal carcinoma ([address]       Past Medical History:   Diagnosis Date    Anemia     fe    Anesthesia     slow to wake up    Back pain, chronic     BPH     Carbon monoxide poisoning 4/25/2013    CLL (chronic lymphocytic leukemia) (HCC)     CLL (chronic lymphocytic leukemia) ([address]     Colon polyps 12/3/2007    tubular and adenomatous    Colostomy in place ([address]     Depression     Diverticulosis     GERD (gastroesophageal reflux disease)     Hematoma     [place] (hard of hearing)     Hypercholesterolemia     Osteoarthritis     Prolonged emergence from general anesthesia     Rectal cancer ([address] 2012    Scoliosis     Spinal stenosis     Stress fracture of femoral neck 6/30/09    left, also stress fracture of femoral head    Stress fracture of femoral neck 6/30/2009    left, also stress fracture of femoral head    Urinary retention      Past Surgical History:   Procedure Laterality Date    BACK SURGERY  6/9/10    L3,4,5    BRAIN SURGERY  2013    hematoma from fall drained    COLONOSCOPY  1/14/11    COLONOSCOPY  7/30/12    polyp    COLONOSCOPY  9/13    no specimens    FINGER AMPUTATION      left index finger    HIP SURGERY  11-9-11    LEFT TOTAL HIP REPLACEMENT, ANTERIOR APPROACH WITH CELLSAVER  AND PLATELET GEL DEPUY    JOINT REPLACEMENT      LEFT HIP    LAPAROTOMY  11-    Exploratory laparotomy, lower sigmoid rectal and anal resection, appendectomy and creation of end colostomy    OTHER SURGICAL HISTORY  08/17/12    rectal EUS    OTHER SURGICAL HISTORY  8/29/2012    POWER PORT INSERTION    TURP       Family History   Problem Relation Age of Onset    Cancer Brother         esophageal    Cancer Brother         LUNG     Social History     Socioeconomic History    Marital status:       Spouse name: Not on file    Number of children: Not on file    Years of education: Not on file    Highest education level: Not on file   Occupational History    Not on file   Social Needs    Financial resource strain: Not on file    Food insecurity:     Worry: Not on file     Inability: Not on file    Transportation needs:     Medical: Not on file     Non-medical: Not on file   Tobacco Use    Smoking status: Never Smoker    Smokeless tobacco: Never Used   Substance and Sexual Activity    Alcohol use: No    Drug use: No    Sexual activity: Never   Lifestyle    Physical activity:     Days per week: Not on file     Minutes per session: Not on file    Stress: Not on file   Relationships    Social connections:     Talks on phone: Not on file     Gets together: Not on file     Attends [employer] service: Not on file     Active member of club or organization: Not on file     Attends meetings of clubs or organizations: Not on file     Relationship status: Not on file    Intimate partner violence:     Fear of current or ex partner: Not on file     Emotionally abused: Not on file     Physically abused: Not on file     Forced sexual activity: Not on file   Other Topics Concern    Not on file   Social History Narrative    Not on file       Review of Systems  A comprehensive review of systems was negative except for what was noted in the HPI, and has right facial swelling of his upper and lower eyelids with increased tearing. Chronic tingling and numbness in feet from chemotherapy. Physical Exam   Constitutional: He is oriented to person, place, and time. He appears well-developed and well-nourished. No distress. HENT:   Head: Normocephalic and atraumatic. Mouth/Throat: Uvula is midline, oropharynx is clear and moist and mucous membranes are normal.   Eyes: Right conjunctiva mildly injected.  Left conjunctiva normal. EOM are normal. Pupils are equal, round, and reactive to light. right upper and lower eyelid swelling noted, swelling extends to over the 03/15/2019 10     AST 03/15/2019 17     Globulin 03/15/2019 2.0     Color, UA 03/15/2019 yellow     Clarity, UA 03/15/2019 clear     Glucose, UA POC 03/15/2019 neg     Bilirubin, UA 03/15/2019 neg     Ketones, UA 03/15/2019 neg     Spec Grav, UA 03/15/2019 1.010     Blood, UA POC 03/15/2019 small     pH, UA 03/15/2019 6.0     Protein, UA POC 03/15/2019 christ     Urobilinogen, UA 03/15/2019 0.2 e.u/dl     Leukocytes, UA 03/15/2019 neg     Nitrite, UA 03/15/2019 neg            Assessment:       80 y.o. patient with planned surgery as above. Known risk factors for perioperative complications:   Essential hypertension    Rectal carcinoma (HCC)    CLL (chronic lymphocytic leukemia) (HCC)    S/P colostomy ([facility] [address] [address]    Normocytic anemia       Current medications which may produce withdrawal symptoms if withheld perioperatively: none      Plan:     Wilsno Sauceda was seen today for pre-op exam.    Diagnoses and all orders for this visit:    Preop examination for Age-related cataract of both eyes, unspecified age-related cataract type  -     EKG 12 Lead  -     Comprehensive Metabolic Panel  -     TSH with Reflex  -     Cancel: CBC Auto Differential  -     Ferritin  -     Folate  -     Vitamin B12  -     Iron and TIBC  -     CBC Auto Differential; Future  1. Preoperative workup as follows: blood work per orders  2. Change in medication regimen before surgery: Hold all medications on morning of surgery  3.  Prophylaxis for cardiac events with perioperative beta-blockers: Not indicated  ACC/AHA indications for pre-operative beta-blocker use:    · Vascular surgery with history of postitive stress test  · Intermediate or high risk surgery with history of CAD   · Intermediate or high risk surgery with multiple clinical predictors of CAD- 2 of the following: history of compensated or prior heart failure, history of cerebrovascular disease, DM, or renal insufficiency    Routine administration of higher-dose, long-acting metoprolol in beta-blocker-naïve patients on the day of surgery, and in the absence of dose titration is associated with an overall increase in mortality. Beta-blockers should be started days to weeks prior to surgery and titrated to pulse < 70.  4. Deep vein thrombosis prophylaxis: regimen to be chosen by surgical team  5. No contraindications to planned surgery if blood work results are stable      Need for prophylactic vaccination and inoculation against varicella  Will need to get at pharmacy as it is not available in the office. Swelling of eyelid, right  -     Comprehensive Metabolic Panel  -     TSH with Reflex  -     CBC Auto Differential  -     olopatadine (PATANOL) 0.1 % ophthalmic solution; Place 1 drop into the right eye 2 times daily    Essential hypertension  -     Comprehensive Metabolic Panel  -     TSH with Reflex  -     CBC Auto Differential  This problem is well controlled. Pt should continue current medication at current dose. Rectal carcinoma (HCC)  -     CBC Auto Differential    CLL (chronic lymphocytic leukemia) (HCC)  -     CBC Auto Differential    S/P colostomy (HCC)  Stable. Continue to monitor at least yearly. Normocytic anemia  -     CBC Auto Differential  -     Ferritin  -     Folate  -     Vitamin B12  -     Iron and TIBC    Quality & Risk Score Accuracy    Visit Dx:  G62.0, T45.1X5A - Chemotherapy-induced neuropathy (HCC)  Assessment and plan:  Stable based upon symptoms and exam. Continue current treatment plan and follow up at least yearly. 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 Continue current treatment plan and follow up at least yearly.   Last edited 06/05/19 05:42 EDT by Harshad Mathew MD

## 2019-06-05 LAB
A/G RATIO: 2.2 (ref 1.1–2.2)
ALBUMIN SERPL-MCNC: 4.2 G/DL (ref 3.4–5)
ALP BLD-CCNC: 94 U/L (ref 40–129)
ALT SERPL-CCNC: 11 U/L (ref 10–40)
ANION GAP SERPL CALCULATED.3IONS-SCNC: 15 MMOL/L (ref 3–16)
AST SERPL-CCNC: 15 U/L (ref 15–37)
BILIRUB SERPL-MCNC: 0.4 MG/DL (ref 0–1)
BUN BLDV-MCNC: 18 MG/DL (ref 7–20)
CALCIUM SERPL-MCNC: 9.9 MG/DL (ref 8.3–10.6)
CHLORIDE BLD-SCNC: 100 MMOL/L (ref 99–110)
CO2: 27 MMOL/L (ref 21–32)
CREAT SERPL-MCNC: 0.9 MG/DL (ref 0.8–1.3)
FERRITIN: 215.9 NG/ML (ref 30–400)
GFR AFRICAN AMERICAN: >60
GFR NON-AFRICAN AMERICAN: >60
GLOBULIN: 1.9 G/DL
GLUCOSE BLD-MCNC: 100 MG/DL (ref 70–99)
IRON SATURATION: 24 % (ref 20–50)
IRON: 59 UG/DL (ref 59–158)
POTASSIUM SERPL-SCNC: 4.6 MMOL/L (ref 3.5–5.1)
SODIUM BLD-SCNC: 142 MMOL/L (ref 136–145)
TOTAL IRON BINDING CAPACITY: 249 UG/DL (ref 260–445)
TOTAL PROTEIN: 6.1 G/DL (ref 6.4–8.2)
TSH REFLEX: 3.71 UIU/ML (ref 0.27–4.2)

## 2019-06-06 ENCOUNTER — TELEPHONE (OUTPATIENT)
Dept: FAMILY MEDICINE CLINIC | Age: 84
End: 2019-06-06

## 2019-06-10 ENCOUNTER — TELEPHONE (OUTPATIENT)
Dept: FAMILY MEDICINE CLINIC | Age: 84
End: 2019-06-10

## 2019-06-10 NOTE — TELEPHONE ENCOUNTER
This is not a duplicate request.   Pt has medicare and [facility] insurnance  DX [id number]

## 2019-06-10 NOTE — PROGRESS NOTES
Rosamaria Golder    Age 80 y.o.    male    8/13/1928    MRN [id number]    Date___________   Arrival Time_____________  OR Time____________Duration____     Procedure(s):  PHACOEMULSIFICATION OF CATARACT RIGHT EYE WITH INTRAOCULAR LENS IMPLANT    Surgeon  ________________________________  [facility]   General   [place]       Phone [phone] (home)       [address]  Cell Work  ______________________________________________________________________________________________________________________________________________________________________________________________________________________________________________________________________________________________________________________________________________________________    PCP__________________________Phone__________________________________      H&P__________________Bringing      Chart              Epic    DOS           Called_______  EKG__________________Bringing      Chart              Epic    DOS           Called_______  LAB__________________ Bringing      Chart              Epic    DOS           Called_______  CardiacClearance _______Bringing      Chart              Epic    DOS           Called_______      Cardiologist________________________ Phone___________________________      ? Jew concerns / Waiver on Chart            PAT Communications________________  ? Pre-op Instructions Given [place]          _________________________________  ? Directions to Surgery Center                          _________________________________  ? Transportation Home_______________      _________________________________  ?  Crutches/Walker__________________        _________________________________      ________Pre-op Orders   _______Transcribed    _______Wt.  ________Pharmacy          _______SCD  ______VTE     ______Beta Blocker  ________Consent

## 2019-06-10 NOTE — TELEPHONE ENCOUNTER
Pt's daughter, Mitch Perez, calling asking if the Patanol eye gtts can be switched from [facility] to [address] and if gtts need a PA or not? Called [facility], had rx switched to [address]. PA submitted earlier this AM by Joelle Joe. Mitch Perez aware this may take 72 hours or more for PA to finalize. We will contact her when complete.      Telephone Information:   Mobile [phone]

## 2019-06-10 NOTE — TELEPHONE ENCOUNTER
[facility] pharmacy is requesting a PA for pt's Olopatadine eye drops, they stated it's not covered under insur.  Please Advise

## 2019-06-11 NOTE — TELEPHONE ENCOUNTER
Received APPROVAL for Olopatadine HCl 0.1% solution through 06/09/2020. Please advise patient. Thank you.

## 2019-06-13 NOTE — PROGRESS NOTES
Obstructive Sleep Apnea (CONSTANTIN) Screening     Patient:  Luci Puentes    YOB: 1928      Medical Record #:  [id number]                     Date:  6/13/2019     1. Are you a loud and/or regular snorer? [x]  Yes       [] No    2. Have you been observed to gasp or stop breathing during sleep? []  Yes       [x] No    3. Do you feel tired or groggy upon awakening or do you awaken with a headache?           []  Yes       [x] No    4. Are you often tired or fatigued during the wake time hours? []  Yes       [x] No    5. Do you fall asleep sitting, reading, watching TV or driving? []  Yes       [x] No    6. Do you often have problems with memory or concentration? []  Yes       [x] No    **If patient's score is ? 3 they are considered high risk for CONSTANTIN. Notify the anesthesiologist of the high risk and document in focus note. Note:  If the patient's BMI is more than 35 kg m¯² , has neck circumference > 40 cm, and/or high blood pressure the risk is greater (© American Sleep Apnea Association, 2006).

## 2019-06-13 NOTE — PROGRESS NOTES
Eileen Poisson    Age 80 y.o.    male    8/13/1928    MRN [id number]    Date___________   Arrival Time_____________  OR Time____________Duration____     Procedure(s):  PHACOEMULSIFICATION OF CATARACT LEFT EYE WITH INTRAOCULAR LENS IMPLANT    Surgeon  ________________________________  [facility]   General   [place]       Phone [phone] (home)       [address]  Cell Work  ______________________________________________________________________________________________________________________________________________________________________________________________________________________________________________________________________________________________________________________________________________________________    PCP__________________________Phone__________________________________      H&P__________________Bringing      Chart              Epic    DOS           Called_______  EKG__________________Bringing      Chart              Epic    DOS           Called_______  LAB__________________ Bringing      Chart              Epic    DOS           Called_______  CardiacClearance _______Bringing      Chart              Epic    DOS           Called_______      Cardiologist________________________ Phone___________________________      ? Mormon concerns / Waiver on Chart            PAT Communications________________  ? Pre-op Instructions Given [place]          _________________________________  ? Directions to Surgery Center                          _________________________________  ? Transportation Home_______________      _________________________________  ?  Crutches/Walker__________________        _________________________________      ________Pre-op Orders   _______Transcribed    _______Wt.  ________Pharmacy          _______SCD  ______VTE     ______Beta Blocker  ________Consent

## 2019-06-14 ENCOUNTER — ANESTHESIA EVENT (OUTPATIENT)
Dept: OPERATING ROOM | Age: 84
End: 2019-06-14
Payer: MEDICARE

## 2019-06-17 ENCOUNTER — ANESTHESIA (OUTPATIENT)
Dept: OPERATING ROOM | Age: 84
End: 2019-06-17
Payer: MEDICARE

## 2019-06-17 ENCOUNTER — HOSPITAL ENCOUNTER (OUTPATIENT)
Age: 84
Setting detail: OUTPATIENT SURGERY
Discharge: HOME | End: 2019-06-17
Attending: OPHTHALMOLOGY | Admitting: OPHTHALMOLOGY
Payer: MEDICARE

## 2019-06-17 VITALS — DIASTOLIC BLOOD PRESSURE: 68 MMHG | OXYGEN SATURATION: 98 % | SYSTOLIC BLOOD PRESSURE: 131 MMHG

## 2019-06-17 VITALS
RESPIRATION RATE: 16 BRPM | OXYGEN SATURATION: 96 % | HEIGHT: 72 IN | SYSTOLIC BLOOD PRESSURE: 122 MMHG | DIASTOLIC BLOOD PRESSURE: 71 MMHG | WEIGHT: 212 LBS | TEMPERATURE: 98.7 F | HEART RATE: 55 BPM | BODY MASS INDEX: 28.71 KG/M2

## 2019-06-17 PROCEDURE — 6370000000 HC RX 637 (ALT 250 FOR IP)

## 2019-06-17 PROCEDURE — 6360000002 HC RX W HCPCS: Performed by: OPHTHALMOLOGY

## 2019-06-17 PROCEDURE — 6360000002 HC RX W HCPCS: Performed by: NURSE ANESTHETIST, CERTIFIED REGISTERED

## 2019-06-17 PROCEDURE — 3600000003 HC SURGERY LEVEL 3 BASE: Performed by: OPHTHALMOLOGY

## 2019-06-17 PROCEDURE — V2632 POST CHMBR INTRAOCULAR LENS: HCPCS | Performed by: OPHTHALMOLOGY

## 2019-06-17 PROCEDURE — 7100000011 HC PHASE II RECOVERY - ADDTL 15 MIN: Performed by: OPHTHALMOLOGY

## 2019-06-17 PROCEDURE — 2580000003 HC RX 258: Performed by: OPHTHALMOLOGY

## 2019-06-17 PROCEDURE — 6370000000 HC RX 637 (ALT 250 FOR IP): Performed by: OPHTHALMOLOGY

## 2019-06-17 PROCEDURE — 2709999900 HC NON-CHARGEABLE SUPPLY: Performed by: OPHTHALMOLOGY

## 2019-06-17 PROCEDURE — 3700000000 HC ANESTHESIA ATTENDED CARE: Performed by: OPHTHALMOLOGY

## 2019-06-17 PROCEDURE — 2580000003 HC RX 258: Performed by: ANESTHESIOLOGY

## 2019-06-17 PROCEDURE — 2720000010 HC SURG SUPPLY STERILE: Performed by: OPHTHALMOLOGY

## 2019-06-17 PROCEDURE — 7100000010 HC PHASE II RECOVERY - FIRST 15 MIN: Performed by: OPHTHALMOLOGY

## 2019-06-17 PROCEDURE — 3600000013 HC SURGERY LEVEL 3 ADDTL 15MIN: Performed by: OPHTHALMOLOGY

## 2019-06-17 PROCEDURE — 3700000001 HC ADD 15 MINUTES (ANESTHESIA): Performed by: OPHTHALMOLOGY

## 2019-06-17 PROCEDURE — 2500000003 HC RX 250 WO HCPCS: Performed by: OPHTHALMOLOGY

## 2019-06-17 DEVICE — ACRYSOF(R) IQ ASPHERIC IOL SP ACRYLIC FOLDABLELENS WULTRASERT(TM) DELIVERY SYSTEM UV WBLUE LIGHT FILTER. 13.0MM LENGTH 6.0MM ANTERIORASYMMETRIC BICONVEX OPTIC PLANAR HAPTICS.
Type: IMPLANTABLE DEVICE | Site: EYE | Status: FUNCTIONAL
Brand: ACRYSOF ULTRASERT

## 2019-06-17 RX ORDER — MIDAZOLAM HYDROCHLORIDE 1 MG/ML
INJECTION INTRAMUSCULAR; INTRAVENOUS PRN
Status: DISCONTINUED | OUTPATIENT
Start: 2019-06-17 | End: 2019-06-17 | Stop reason: SDUPTHER

## 2019-06-17 RX ORDER — SODIUM CHLORIDE, SODIUM LACTATE, POTASSIUM CHLORIDE, CALCIUM CHLORIDE 600; 310; 30; 20 MG/100ML; MG/100ML; MG/100ML; MG/100ML
INJECTION, SOLUTION INTRAVENOUS CONTINUOUS
Status: DISCONTINUED | OUTPATIENT
Start: 2019-06-17 | End: 2019-06-17 | Stop reason: HOSPADM

## 2019-06-17 RX ORDER — MAGNESIUM HYDROXIDE 1200 MG/15ML
LIQUID ORAL PRN
Status: DISCONTINUED | OUTPATIENT
Start: 2019-06-17 | End: 2019-06-17 | Stop reason: ALTCHOICE

## 2019-06-17 RX ORDER — FENTANYL CITRATE 50 UG/ML
INJECTION, SOLUTION INTRAMUSCULAR; INTRAVENOUS PRN
Status: DISCONTINUED | OUTPATIENT
Start: 2019-06-17 | End: 2019-06-17 | Stop reason: SDUPTHER

## 2019-06-17 RX ADMIN — Medication 0.3 ML: at 12:24

## 2019-06-17 RX ADMIN — MIDAZOLAM HYDROCHLORIDE 1 MG: 2 INJECTION, SOLUTION INTRAMUSCULAR; INTRAVENOUS at 13:32

## 2019-06-17 RX ADMIN — FENTANYL CITRATE 25 MCG: 50 INJECTION INTRAMUSCULAR; INTRAVENOUS at 13:32

## 2019-06-17 RX ADMIN — SODIUM CHLORIDE, POTASSIUM CHLORIDE, SODIUM LACTATE AND CALCIUM CHLORIDE: 600; 310; 30; 20 INJECTION, SOLUTION INTRAVENOUS at 12:33

## 2019-06-17 ASSESSMENT — PULMONARY FUNCTION TESTS
PIF_VALUE: 0
PIF_VALUE: 1
PIF_VALUE: 0
PIF_VALUE: 1
PIF_VALUE: 0

## 2019-06-17 ASSESSMENT — PAIN SCALES - GENERAL: PAINLEVEL_OUTOF10: 0

## 2019-06-17 ASSESSMENT — LIFESTYLE VARIABLES: SMOKING_STATUS: 0

## 2019-06-17 ASSESSMENT — PAIN - FUNCTIONAL ASSESSMENT: PAIN_FUNCTIONAL_ASSESSMENT: 0-10

## 2019-06-17 NOTE — ANESTHESIA PRE PROCEDURE
Department of Anesthesiology  Preprocedure Note       Name:  Haley Miramontes   Age:  80 y.o.  :  1928                                          MRN:  [id number]         Date:  2019      Surgeon:  Norman Mansfield MD    Procedure: PHACOEMULSIFICATION OF CATARACT RIGHT EYE WITH INTRAOCULAR LENS IMPLANT (Right Eye)    Medications prior to admission:    olopatadine (PATANOL) 0.1 % ophthalmic solution Place 1 drop into the right eye 2 times daily   citalopram (CELEXA) 10 MG tablet Take 10 mg by mouth daily   loratadine (KLS ALLERCLEAR) 10 MG tablet Take 10 mg by mouth daily   famotidine (PEPCID) 20 MG tablet TAKE 1 TABLET BY MOUTH TWICE A DAY AS NEEDED   Lift Chair MISC by Does not apply route   furosemide (LASIX) 40 MG tablet Take 1 tablet by mouth daily as needed (edema)   Misc.  Devices ([address] [place] [address]) MISC For use while walking   Lift Chair MISC by Does not apply route   ibuprofen (ADVIL;MOTRIN) 200 MG tablet Take 200 mg by mouth every 6 hours as needed for Pain      Allergies:     Adhesive Tape      Fragile skin paper tape only     Problem List:     GERD (gastroesophageal reflux disease)    Benign prostatic hyperplasia    Osteoarthritis    Hypercholesterolemia    CLL (chronic lymphocytic leukemia) (HCC)    Scoliosis    Diverticulosis    Lumbar spinal stenosis    Hypertension    Chronic back pain    Left hip pain    Heartburn    Elevated glucose    S/P colostomy (HCC)    Enthesopathy of hip region    Primary localized osteoarthrosis, lower leg    Primary localized osteoarthrosis, pelvic region and thigh    Chemotherapy-induced neuropathy (HCC)    Personal history of rectal cancer    Secondary malignancy of right lung ([address]    Rectal carcinoma ([address]     Past Medical History:     Anemia     fe    Anesthesia     slow to wake up    Back pain, chronic     BPH     Carbon monoxide poisoning 2013    CLL (chronic lymphocytic leukemia) (HCC)     daughter denies    [facility]) [facility]     Colon polyps 12/3/2007    tubular and adenomatous    Colostomy in [facility])     Depression     Diverticulosis     GERD (gastroesophageal reflux disease)     Hematoma     [place] (hard of hearing)     Hypercholesterolemia     no meds    Lung nodules     Osteoarthritis     Prolonged emergence from general anesthesia     Rectal cancer ([facility] 2012    Scoliosis     Spinal stenosis     Stress fracture of femoral neck 6/30/09    left, also stress fracture of femoral head    Stress fracture of femoral neck 6/30/2009    left, also stress fracture of femoral head    Urinary retention      Past Surgical History:     BACK SURGERY  6/9/10    L3,4,5    BRAIN SURGERY  2013    hematoma from fall drained    COLONOSCOPY  1/14/11    COLONOSCOPY  7/30/12    polyp    COLONOSCOPY  9/13    no specimens    FINGER AMPUTATION      left index finger    HIP SURGERY  11-9-11    LEFT TOTAL HIP REPLACEMENT, ANTERIOR APPROACH WITH CELLSAVER  AND PLATELET GEL DEPUY    JOINT REPLACEMENT      LEFT HIP    LAPAROTOMY  11-    Exploratory laparotomy, lower sigmoid rectal and anal resection, appendectomy and creation of end colostomy    OTHER SURGICAL HISTORY  08/17/12    rectal EUS    OTHER SURGICAL HISTORY  8/29/2012    POWER PORT INSERTION    TURP       Social History:     Smoking status: Never Smoker    Smokeless tobacco: Never Used   Substance Use Topics    Alcohol use: No     Vital Signs (Current):         BP: 123/78 Pulse: 78   Resp: 16 SpO2: 97   Temp: 98.2 °F (36.8 °C)   Height: 6' (1.829 m)  (06/17/19) Weight: 212 lb (96.2 kg)  (06/17/19)   BMI: 28.8             BP Readings from Last 3 Encounters:   06/04/19 132/78   03/15/19 (!) 100/58   08/15/18 118/70     NPO Status:> 8 hrs    CBC:    WBC 7.1 06/04/2019    RBC 4.56 02/27/2017    HGB 13.0 06/04/2019    HCT 39.3 06/04/2019     06/04/2019     CMP:     06/04/2019    K 4.6 06/04/2019     06/04/2019    CO2 27 06/04/2019    BUN 18 06/04/2019    CREATININE 0.9 06/04/2019    GLUCOSE 100 06/04/2019    PROT 6.1 06/04/2019    CALCIUM 9.9 06/04/2019    BILITOT 0.4 06/04/2019    ALKPHOS 94 06/04/2019    AST 15 06/04/2019    ALT 11 06/04/2019     Anesthesia Evaluation  Patient summary reviewed and Nursing notes reviewed  Airway: Mallampati: II  TM distance: >3 FB   Neck ROM: full  Mouth opening: > = 3 FB Dental:          Pulmonary:       (-) COPD, asthma, sleep apnea and not a current smoker                           Cardiovascular:    (+) hypertension:,     (-) past MI and  PINEDO                Neuro/Psych:   (+) depression/anxiety    (-) seizures and CVA            ROS comment: Neuropathy 2/2 Chemo    S/P Lumbar spine surgery GI/Hepatic/Renal:   (+) GERD: well controlled,      (-) no renal disease      ROS comment: BPH-> TURP. Endo/Other:    (+) blood dyscrasia::., malignancy/cancer. (-) diabetes mellitus                ROS comment: H/O Rectal Ca-> Surg + Chemo   Abdominal:           Vascular:     - DVT and PE. Anesthesia Plan      TIVA and MAC     ASA 3       Induction: intravenous. Anesthetic plan and risks discussed with patient. Plan discussed with CRNA.             Nilsa Grant MD

## 2019-06-17 NOTE — OP NOTE
[facility]    OPERATIVE NOTE    Preoperative Diagnosis: Cataract right eye    Postoperative Diagnosis: Cataract right eye     Procedure: Complex phacoemulsification with intraocular lens inplantation, right eye    Surgeon: Marla Hernandez M.D., Ph.D. Anesthesia: MAC with topical    Complications: none    Specimens: none    Indications for procedure: The patient is a 80y.o. year old with decreased vision, glare and halos around lights, and trouble with activities of daily living. Examination revealed a visually significant cataract in the right eye. The patient also showed evidence of poor dilation. Risks, benefits, and alternatives to surgery were discussed with the patient and the patient elected to proceed with phacoemulsification with lens implantation. Details of the procedure: Following informed consent, the patient was taken to the operating room and placed in the supine position. The eye was prepped and draped in the usual sterile fashion using aseptic technique for cataract surgery. Following topical tetracaine drops a side port incision was made in the superotemporal cornea. The eye was filled with Trypan blue followed by balanced salt solution. The eye was filled with 1% non-preserved lidocaine. The eye was filled with viscoelastic and a 2.2 mm keratome blade was used to make a 3-plane clear corneal incision in the temporal cornea. A 7.0 mm Malyugin ring was inserted in the anterior chamber to aid with pupil dilation. The cystitome was used to make a tear in the anterior capsule and a Utrata forceps was used to make a complete curvilinear capsulorrhexis. The lens was hydrodissected and freely rotated. Phacoemulsification was performed. Irrigation/aspiration was used to remove all cortical material from the capsular bag. The eye was filled with viscoelastic and a foldable posterior chamber intraocular lens was injected into the capsular bag. The Malyugin ring was removed. Irrigation/aspiration was used to remove all excess viscoelastic. The eye was pressurized and the wounds were check for leaks and none were found. The patient had Betadine and Alphagan solutions placed on the eye. The eye was covered with a metal shield. The patient went to the PACU in excellent condition, having tolerated the procedure extremely well, and will follow up with me tomorrow for postop day 1 care.     Galina Sherman MD, PhD, FACS

## 2019-06-17 NOTE — H&P
The H&P was reviewed, the patient was examined, and no change has occurred in the patient's condition since the H&P was completed.     Félix Zamorano MD, PhD, FACS

## 2019-06-20 NOTE — H&P
The H&P was reviewed, the patient was examined, and no change has occurred in the patient's condition since the H&P was completed.     Denice Hendrickson MD, PhD, FACS
OTHER SURGICAL HISTORY  8/29/2012    POWER PORT INSERTION    TURP         No current facility-administered medications for this encounter. Current Outpatient Medications:     olopatadine (PATANOL) 0.1 % ophthalmic solution, Place 1 drop into the right eye 2 times daily, Disp: 1 Bottle, Rfl: 0    citalopram (CELEXA) 10 MG tablet, Take 10 mg by mouth daily, Disp: , Rfl:     loratadine (KLS ALLERCLEAR) 10 MG tablet, Take 10 mg by mouth daily, Disp: , Rfl:     famotidine (PEPCID) 20 MG tablet, TAKE 1 TABLET BY MOUTH TWICE A DAY AS NEEDED, Disp: 180 tablet, Rfl: 1    furosemide (LASIX) 40 MG tablet, Take 1 tablet by mouth daily as needed (edema), Disp: 90 tablet, Rfl: 2    ibuprofen (ADVIL;MOTRIN) 200 MG tablet, Take 200 mg by mouth every 6 hours as needed for Pain , Disp: , Rfl:     Lift Chair MISC, by Does not apply route, Disp: 1 each, Rfl: 0    Misc. Devices (MICHAEL ROLLING WALKER) MISC, For use while walking, Disp: 1 each, Rfl: 0    Lift Chair MISC, by Does not apply route, Disp: 1 each, Rfl: 0    Scheduled Meds:    PRN Meds:. Allergies   Allergen Reactions    Adhesive Tape      Fragile skin paper tape only         PHYSICAL EXAMINATION    Vitals:    06/17/19 1350   BP: 122/71   Pulse: 55   Resp: 16   Temp: 98.7 °F (37.1 °C)   SpO2: 96%       Gen: NAD  HEENT: OP clear, TM clear bilaterally; see outpatient ophthalmology record  Pulm: CTA B  Heart: RRR, no C/M/R/G  Abd: S/NT/ND  Ext: WWP, no C/C/E  Neuro: no focal defecits    Assessment:   1. Cataract  2. See preoperative ophthalmology notes    Plan:   1. Risks, benefits, alternatives to surgery discussed with the patient and family. 2. All questions were answered to their satisfaction. 3. Ok to proceed with surgery as planned.     Paula Saini

## 2019-06-24 ENCOUNTER — ANESTHESIA (OUTPATIENT)
Dept: OPERATING ROOM | Age: 84
End: 2019-06-24
Payer: MEDICARE

## 2019-06-24 ENCOUNTER — HOSPITAL ENCOUNTER (OUTPATIENT)
Age: 84
Setting detail: OUTPATIENT SURGERY
Discharge: HOME | End: 2019-06-24
Attending: OPHTHALMOLOGY | Admitting: OPHTHALMOLOGY
Payer: MEDICARE

## 2019-06-24 ENCOUNTER — ANESTHESIA EVENT (OUTPATIENT)
Dept: OPERATING ROOM | Age: 84
End: 2019-06-24
Payer: MEDICARE

## 2019-06-24 VITALS
WEIGHT: 212 LBS | SYSTOLIC BLOOD PRESSURE: 135 MMHG | HEART RATE: 49 BPM | RESPIRATION RATE: 16 BRPM | BODY MASS INDEX: 28.71 KG/M2 | TEMPERATURE: 98.5 F | OXYGEN SATURATION: 98 % | HEIGHT: 72 IN | DIASTOLIC BLOOD PRESSURE: 82 MMHG

## 2019-06-24 VITALS — OXYGEN SATURATION: 100 % | DIASTOLIC BLOOD PRESSURE: 82 MMHG | SYSTOLIC BLOOD PRESSURE: 139 MMHG

## 2019-06-24 PROCEDURE — 2580000003 HC RX 258: Performed by: OPHTHALMOLOGY

## 2019-06-24 PROCEDURE — 7100000011 HC PHASE II RECOVERY - ADDTL 15 MIN: Performed by: OPHTHALMOLOGY

## 2019-06-24 PROCEDURE — 2580000003 HC RX 258: Performed by: ANESTHESIOLOGY

## 2019-06-24 PROCEDURE — 6370000000 HC RX 637 (ALT 250 FOR IP): Performed by: OPHTHALMOLOGY

## 2019-06-24 PROCEDURE — 6360000002 HC RX W HCPCS: Performed by: OPHTHALMOLOGY

## 2019-06-24 PROCEDURE — 2720000010 HC SURG SUPPLY STERILE: Performed by: OPHTHALMOLOGY

## 2019-06-24 PROCEDURE — V2632 POST CHMBR INTRAOCULAR LENS: HCPCS | Performed by: OPHTHALMOLOGY

## 2019-06-24 PROCEDURE — 3700000000 HC ANESTHESIA ATTENDED CARE: Performed by: OPHTHALMOLOGY

## 2019-06-24 PROCEDURE — 7100000010 HC PHASE II RECOVERY - FIRST 15 MIN: Performed by: OPHTHALMOLOGY

## 2019-06-24 PROCEDURE — 3600000003 HC SURGERY LEVEL 3 BASE: Performed by: OPHTHALMOLOGY

## 2019-06-24 PROCEDURE — 3700000001 HC ADD 15 MINUTES (ANESTHESIA): Performed by: OPHTHALMOLOGY

## 2019-06-24 PROCEDURE — 2500000003 HC RX 250 WO HCPCS: Performed by: OPHTHALMOLOGY

## 2019-06-24 PROCEDURE — 2709999900 HC NON-CHARGEABLE SUPPLY: Performed by: OPHTHALMOLOGY

## 2019-06-24 PROCEDURE — 3600000013 HC SURGERY LEVEL 3 ADDTL 15MIN: Performed by: OPHTHALMOLOGY

## 2019-06-24 PROCEDURE — 6360000002 HC RX W HCPCS: Performed by: NURSE ANESTHETIST, CERTIFIED REGISTERED

## 2019-06-24 DEVICE — ACRYSOF(R) IQ ASPHERIC IOL SP ACRYLIC FOLDABLELENS WULTRASERT(TM) DELIVERY SYSTEM UV WBLUE LIGHT FILTER. 13.0MM LENGTH 6.0MM ANTERIORASYMMETRIC BICONVEX OPTIC PLANAR HAPTICS.
Type: IMPLANTABLE DEVICE | Site: EYE | Status: FUNCTIONAL
Brand: ACRYSOF ULTRASERT

## 2019-06-24 RX ORDER — TETRACAINE HYDROCHLORIDE 5 MG/ML
SOLUTION OPHTHALMIC PRN
Status: DISCONTINUED | OUTPATIENT
Start: 2019-06-24 | End: 2019-06-24 | Stop reason: ALTCHOICE

## 2019-06-24 RX ORDER — OXYCODONE HYDROCHLORIDE AND ACETAMINOPHEN 5; 325 MG/1; MG/1
1 TABLET ORAL PRN
Status: DISCONTINUED | OUTPATIENT
Start: 2019-06-24 | End: 2019-06-24 | Stop reason: HOSPADM

## 2019-06-24 RX ORDER — ONDANSETRON 2 MG/ML
4 INJECTION INTRAMUSCULAR; INTRAVENOUS PRN
Status: DISCONTINUED | OUTPATIENT
Start: 2019-06-24 | End: 2019-06-24 | Stop reason: HOSPADM

## 2019-06-24 RX ORDER — MEPERIDINE HYDROCHLORIDE 50 MG/ML
12.5 INJECTION INTRAMUSCULAR; INTRAVENOUS; SUBCUTANEOUS EVERY 5 MIN PRN
Status: DISCONTINUED | OUTPATIENT
Start: 2019-06-24 | End: 2019-06-24 | Stop reason: HOSPADM

## 2019-06-24 RX ORDER — LIDOCAINE HYDROCHLORIDE 10 MG/ML
0.3 INJECTION, SOLUTION EPIDURAL; INFILTRATION; INTRACAUDAL; PERINEURAL
Status: DISCONTINUED | OUTPATIENT
Start: 2019-06-24 | End: 2019-06-24 | Stop reason: HOSPADM

## 2019-06-24 RX ORDER — MIDAZOLAM HYDROCHLORIDE 1 MG/ML
INJECTION INTRAMUSCULAR; INTRAVENOUS PRN
Status: DISCONTINUED | OUTPATIENT
Start: 2019-06-24 | End: 2019-06-24 | Stop reason: SDUPTHER

## 2019-06-24 RX ORDER — SODIUM CHLORIDE, SODIUM LACTATE, POTASSIUM CHLORIDE, CALCIUM CHLORIDE 600; 310; 30; 20 MG/100ML; MG/100ML; MG/100ML; MG/100ML
INJECTION, SOLUTION INTRAVENOUS CONTINUOUS
Status: DISCONTINUED | OUTPATIENT
Start: 2019-06-24 | End: 2019-06-24 | Stop reason: HOSPADM

## 2019-06-24 RX ORDER — DIPHENHYDRAMINE HYDROCHLORIDE 50 MG/ML
12.5 INJECTION INTRAMUSCULAR; INTRAVENOUS
Status: DISCONTINUED | OUTPATIENT
Start: 2019-06-24 | End: 2019-06-24 | Stop reason: HOSPADM

## 2019-06-24 RX ORDER — HYDRALAZINE HYDROCHLORIDE 20 MG/ML
5 INJECTION INTRAMUSCULAR; INTRAVENOUS EVERY 10 MIN PRN
Status: DISCONTINUED | OUTPATIENT
Start: 2019-06-24 | End: 2019-06-24 | Stop reason: HOSPADM

## 2019-06-24 RX ORDER — MORPHINE SULFATE 2 MG/ML
1 INJECTION, SOLUTION INTRAMUSCULAR; INTRAVENOUS EVERY 5 MIN PRN
Status: DISCONTINUED | OUTPATIENT
Start: 2019-06-24 | End: 2019-06-24 | Stop reason: HOSPADM

## 2019-06-24 RX ORDER — SODIUM CHLORIDE 0.9 % (FLUSH) 0.9 %
10 SYRINGE (ML) INJECTION EVERY 12 HOURS SCHEDULED
Status: DISCONTINUED | OUTPATIENT
Start: 2019-06-24 | End: 2019-06-24 | Stop reason: HOSPADM

## 2019-06-24 RX ORDER — FENTANYL CITRATE 50 UG/ML
INJECTION, SOLUTION INTRAMUSCULAR; INTRAVENOUS PRN
Status: DISCONTINUED | OUTPATIENT
Start: 2019-06-24 | End: 2019-06-24 | Stop reason: SDUPTHER

## 2019-06-24 RX ORDER — MORPHINE SULFATE 2 MG/ML
2 INJECTION, SOLUTION INTRAMUSCULAR; INTRAVENOUS EVERY 5 MIN PRN
Status: DISCONTINUED | OUTPATIENT
Start: 2019-06-24 | End: 2019-06-24 | Stop reason: HOSPADM

## 2019-06-24 RX ORDER — SODIUM CHLORIDE 0.9 % (FLUSH) 0.9 %
10 SYRINGE (ML) INJECTION PRN
Status: DISCONTINUED | OUTPATIENT
Start: 2019-06-24 | End: 2019-06-24 | Stop reason: HOSPADM

## 2019-06-24 RX ORDER — PROMETHAZINE HYDROCHLORIDE 25 MG/ML
6.25 INJECTION, SOLUTION INTRAMUSCULAR; INTRAVENOUS
Status: DISCONTINUED | OUTPATIENT
Start: 2019-06-24 | End: 2019-06-24 | Stop reason: HOSPADM

## 2019-06-24 RX ORDER — MAGNESIUM HYDROXIDE 1200 MG/15ML
LIQUID ORAL PRN
Status: DISCONTINUED | OUTPATIENT
Start: 2019-06-24 | End: 2019-06-24 | Stop reason: ALTCHOICE

## 2019-06-24 RX ORDER — LABETALOL HYDROCHLORIDE 5 MG/ML
5 INJECTION, SOLUTION INTRAVENOUS EVERY 10 MIN PRN
Status: DISCONTINUED | OUTPATIENT
Start: 2019-06-24 | End: 2019-06-24 | Stop reason: HOSPADM

## 2019-06-24 RX ORDER — OXYCODONE HYDROCHLORIDE AND ACETAMINOPHEN 5; 325 MG/1; MG/1
2 TABLET ORAL PRN
Status: DISCONTINUED | OUTPATIENT
Start: 2019-06-24 | End: 2019-06-24 | Stop reason: HOSPADM

## 2019-06-24 RX ADMIN — FENTANYL CITRATE 25 MCG: 50 INJECTION INTRAMUSCULAR; INTRAVENOUS at 13:17

## 2019-06-24 RX ADMIN — Medication 0.3 ML: at 12:24

## 2019-06-24 RX ADMIN — Medication 0.3 ML: at 12:04

## 2019-06-24 RX ADMIN — SODIUM CHLORIDE, POTASSIUM CHLORIDE, SODIUM LACTATE AND CALCIUM CHLORIDE: 600; 310; 30; 20 INJECTION, SOLUTION INTRAVENOUS at 12:12

## 2019-06-24 RX ADMIN — Medication 0.3 ML: at 12:13

## 2019-06-24 RX ADMIN — MIDAZOLAM HYDROCHLORIDE 1 MG: 2 INJECTION, SOLUTION INTRAMUSCULAR; INTRAVENOUS at 13:17

## 2019-06-24 ASSESSMENT — PULMONARY FUNCTION TESTS
PIF_VALUE: 0
PIF_VALUE: 1
PIF_VALUE: 0
PIF_VALUE: 1
PIF_VALUE: 0

## 2019-06-24 ASSESSMENT — PAIN - FUNCTIONAL ASSESSMENT: PAIN_FUNCTIONAL_ASSESSMENT: 0-10

## 2019-06-24 NOTE — ANESTHESIA PRE PROCEDURE
Department of Anesthesiology  Preprocedure Note       Name:  Waldemar Bryan   Age:  80 y.o.  :  1928                                          MRN:  [id number]         Date:  2019      Surgeon: Ivis Olivo):  Flip Pablo MD    Procedure: PHACOEMULSIFICATION OF CATARACT LEFT EYE WITH INTRAOCULAR LENS IMPLANT (Left Eye)    Medications prior to admission:   Prior to Admission medications    Medication Sig Start Date End Date Taking? Authorizing Provider   olopatadine (PATANOL) 0.1 % ophthalmic solution Place 1 drop into the right eye 2 times daily 19 Yes Mary Martin MD   citalopram (CELEXA) 10 MG tablet Take 10 mg by mouth daily   Yes Historical Provider, MD   loratadine (KLS ALLERCLEAR) 10 MG tablet Take 10 mg by mouth daily   Yes Historical Provider, MD   famotidine (PEPCID) 20 MG tablet TAKE 1 TABLET BY MOUTH TWICE A DAY AS NEEDED 18  Yes Mary Martin MD   furosemide (LASIX) 40 MG tablet Take 1 tablet by mouth daily as needed (edema) 17  Yes Mary Martin MD   ibuprofen (ADVIL;MOTRIN) 200 MG tablet Take 200 mg by mouth every 6 hours as needed for Pain    Yes Historical Provider, MD   Lift Chair [id number] by Does not apply route 18   Mary Martin MD   Misc.  Devices (MICHAEL Ronen) MISC For use while walking 9/8/15   Mary Martin MD   Lift Chair MISC by Does not apply route 9/8/15   Mary Martin MD       Current medications:    Current Facility-Administered Medications   Medication Dose Route Frequency Provider Last Rate Last Dose    lactated ringers infusion   Intravenous Continuous Krystal Pederson  mL/hr at 19 1212      sodium chloride flush 0.9 % injection 10 mL  10 mL Intravenous 2 times per day Krystal Pederson MD        sodium chloride flush 0.9 % injection 10 mL  10 mL Intravenous PRN Krystal Pederson MD        lidocaine PF 1 % injection 0.3 mL  0.3 mL Intradermal Once PRN Krystal Pederson MD        epinephrine and lidocaine 2% PF in BSS injection (1 mL)   Intraocular Once Velvet Blum MD           Allergies:     Allergies   Allergen Reactions    Adhesive Tape      Fragile skin paper tape only       Problem List:    Patient Active Problem List   Diagnosis Code    GERD (gastroesophageal reflux disease) K21.9    Benign prostatic hyperplasia N40.0    Osteoarthritis M19.90    Hypercholesterolemia E78.00    CLL (chronic lymphocytic leukemia) ([address] C91.90    Scoliosis     Diverticulosis K57.90    Lumbar spinal stenosis M48.061    Hypertension I10    Chronic back pain M54.9, G89.29    Left hip pain M25.552    Heartburn R12    Elevated glucose R73.09    S/P colostomy (HCC) Z93.3    Enthesopathy of hip region M76.899    Primary localized osteoarthrosis, lower leg M17.10    Primary localized osteoarthrosis, pelvic region and thigh M16.10    Chemotherapy-induced neuropathy (HCC) G62.0, T45.1X5A    Personal history of rectal cancer Z85.048    Secondary malignancy of right lung (HCC) C78.01    Rectal carcinoma ([facility] C20       Past Medical History:        Diagnosis Date    Anemia     fe    Anesthesia     slow to wake up    Back pain, chronic     BPH     Carbon monoxide poisoning 4/25/2013    CLL (chronic lymphocytic leukemia) (HCC)     daughter denies    CLL (chronic lymphocytic leukemia) ([address]     Colon polyps 12/3/2007    tubular and adenomatous    Colostomy in place ([address]     Depression     Diverticulosis     GERD (gastroesophageal reflux disease)     Hematoma     [place]hard of hearing[place]     Hypercholesterolemia     no meds    Lung nodules     Osteoarthritis     Prolonged emergence from general anesthesia     Rectal cancer ([address] 2012    chemo & radiation    Scoliosis     Spinal stenosis     Stress fracture of femoral neck 6/30/09    left, also stress fracture of femoral head    Stress fracture of femoral neck 6/30/2009    left, also stress fracture of femoral head    Urinary retention        Past Surgical History: Procedure Laterality Date    BACK SURGERY  6/9/10    L3,4,5    BRAIN SURGERY  2013    hematoma from fall drained    COLONOSCOPY  1/14/11    COLONOSCOPY  7/30/12    polyp    COLONOSCOPY  9/13    no specimens    FINGER AMPUTATION      left index finger    HIP SURGERY  11-9-11    LEFT TOTAL HIP REPLACEMENT, ANTERIOR APPROACH WITH CELLSAVER  AND PLATELET GEL DEPUY    INTRACAPSULAR CATARACT EXTRACTION Right 6/17/2019    PHACOEMULSIFICATION OF CATARACT RIGHT EYE WITH INTRAOCULAR LENS IMPLANT performed [facility]    LAPAROTOMY  11-    Exploratory laparotomy, lower sigmoid rectal and anal resection, appendectomy and creation of end colostomy    OTHER SURGICAL HISTORY  08/17/12    rectal EUS    OTHER SURGICAL HISTORY  8/29/2012    POWER PORT INSERTION    TURP         Social History:    Social History     Tobacco Use    Smoking status: Never Smoker    Smokeless tobacco: Never Used   Substance Use Topics    Alcohol use: No                                Counseling given: Not Answered      Vital Signs (Current):   Vitals:    06/24/19 1157   BP: 139/69   Pulse: (!) 47   Resp: 18   Temp: 98.4 °F (36.9 °C)   TempSrc: Temporal   SpO2: 99%   Weight: 212 lb (96.2 kg)   Height: 6' (1.829 m)                                              BP Readings from Last 3 Encounters:   06/24/19 139/69   06/17/19 131/68   06/17/19 122/71       NPO Status: Time of last liquid consumption: 2200                        Time of last solid consumption: 2200                        Date of last liquid consumption: 06/23/19                        Date of last solid food consumption: 06/23/19    BMI:   Wt Readings from Last 3 Encounters:   06/24/19 212 lb (96.2 kg)   06/17/19 212 lb (96.2 kg)   06/04/19 212 lb (96.2 kg)     Body mass index is 28.75 kg/m².     CBC:   Lab Results   Component Value Date    WBC 7.1 06/04/2019    RBC 4.09 06/04/2019    RBC 4.56 02/27/2017    HGB 13.0 06/04/2019    HCT 39.3 06/04/2019    MCV 96.1 06/04/2019    RDW 15.2 06/04/2019     06/04/2019       CMP:   Lab Results   Component Value Date     06/04/2019    K 4.6 06/04/2019     06/04/2019    CO2 27 06/04/2019    BUN 18 06/04/2019    CREATININE 0.9 06/04/2019    GFRAA >60 06/04/2019    GFRAA >60 05/31/2013    AGRATIO 2.2 06/04/2019    LABGLOM >60 06/04/2019    GLUCOSE 100 06/04/2019    GLUCOSE 87 02/27/2017    PROT 6.1 06/04/2019    PROT 6.1 02/27/2017    CALCIUM 9.9 06/04/2019    BILITOT 0.4 06/04/2019    ALKPHOS 94 06/04/2019    AST 15 06/04/2019    ALT 11 06/04/2019       POC Tests: No results for input(s): POCGLU, POCNA, POCK, POCCL, POCBUN, POCHEMO, POCHCT in the last 72 hours. Coags:   Lab Results   Component Value Date    PROTIME 11.5 08/24/2015    INR 1.06 08/24/2015    APTT 30.8 11/15/2012       HCG (If Applicable): No results found for: PREGTESTUR, PREGSERUM, HCG, HCGQUANT     ABGs:   Lab Results   Component Value Date    PHART 7.432 04/25/2013    PO2ART 111.5 04/25/2013    ARN0DKV 38.2 04/25/2013    WAY1KBM 24.9 04/25/2013    BEART 0.7 04/25/2013    H1CGISSN 98.2 04/25/2013        Type & Screen (If Applicable):  Lab Results   Component Value Date    LABABO O 11/12/2012    [facility] MILVIA Positive 11/12/2012       Anesthesia Evaluation  Patient summary reviewed and Nursing notes reviewed no history of anesthetic complications:   Airway: Mallampati: II        Dental:    (+) upper dentures      Pulmonary:Negative Pulmonary ROS and normal exam                               Cardiovascular:Negative CV ROS    (+) hypertension:,                   Neuro/Psych:   Negative Neuro/Psych ROS  (+) neuromuscular disease:, psychiatric history:            GI/Hepatic/Renal: Neg GI/Hepatic/Renal ROS  (+) GERD:,           Endo/Other: Negative Endo/Other ROS                    Abdominal:           Vascular: negative vascular ROS.                                        Anesthesia Plan      MAC     ASA 3 (I discussed with the patient the risks and benefits of PIV, MAC & general anesthesia, IV Narcotics, PACU. All questions were answered, the patient agrees with the plan.)  Induction: intravenous. MIPS: Postoperative opioids intended and Prophylactic antiemetics administered. Anesthetic plan and risks discussed with patient.                       Debora Hammans, MD   6/24/2019

## 2019-06-24 NOTE — OP NOTE
[facility]    OPERATIVE NOTE    Preoperative Diagnosis: Cataract left eye    Postoperative Diagnosis: Cataract left eye    Procedure: Complex phacoemulsification with intraocular lens inplantation, left eye    Surgeon: Giovanni French M.D., Ph.D. Anesthesia: MAC with topical    Complications: none    Specimens: none    Indications for procedure: The patient is a 80y.o. year old with decreased vision, glare and halos around lights, and trouble with activities of daily living. Examination revealed a visually significant cataract in the left eye. The patient also showed evidence of poor dilation. Risks, benefits, and alternatives to surgery were discussed with the patient and the patient elected to proceed with phacoemulsification with lens implantation. Details of the procedure: Following informed consent, the patient was taken to the operating room and placed in the supine position. The eye was prepped and draped in the usual sterile fashion using aseptic technique for cataract surgery. Following topical tetracaine drops a side port incision was made in the inferotemporal cornea. The eye was filled with Trypan blue followed by balanced salt solution. The eye was filled with 1% non-preserved lidocaine. The eye was filled with viscoelastic and a 2.2 mm keratome blade was used to make a 3-plane clear corneal incision in the superotemporal cornea. A 7.0 mm Malyugin ring was inserted in the anterior chamber to aid with pupil dilation. The cystitome was used to make a tear in the anterior capsule and a Utrata forceps was used to make a complete curvilinear capsulorrhexis. The lens was hydrodissected and freely rotated. Phacoemulsification was performed. Irrigation/aspiration was used to remove all cortical material from the capsular bag. The eye was filled with viscoelastic and a foldable posterior chamber intraocular lens was injected into the capsular bag. The Malyugin ring was removed. Irrigation/aspiration was used to remove all excess viscoelastic. The eye was pressurized and the wounds were check for leaks and none were found. The patient had Betadine and Alphagan solutions placed on the eye. The eye was covered with a metal shield. The patient went to the PACU in excellent condition, having tolerated the procedure extremely well, and will follow up with me tomorrow for postop day 1 care.     Swetha Laird MD, PhD, FACS

## 2019-06-26 ENCOUNTER — NURSE TRIAGE (OUTPATIENT)
Dept: OTHER | Facility: CLINIC | Age: 84
End: 2019-06-26

## 2019-06-26 NOTE — TELEPHONE ENCOUNTER
Reason for Disposition   Patient sounds very sick or weak to the triager    Protocols used: DIZZINESS-ADULT-[place]    Patient's daughter called pre-service center [facility]) to schedule appointment, with red flag complaint, transferred to RN access for triage. Daughter reports that patient has been experiencing intermittent dizziness since cataract procedure last week. Daughter reports that patient has also been experiencing weakness and increased confusion at times. Daughter reports that at cataract procedure, patient was told that his HR was bradycardic, between 37-49 at that time. Daughter reports that patient is not experiencing dizziness or weakness today, last episode was yesterday. Writer consulted with PCP office and was instructed to make patient an appointment today, Bret Jones provided warm transfer to [facility] for appointment scheduling.

## 2019-06-27 ENCOUNTER — OFFICE VISIT (OUTPATIENT)
Dept: FAMILY MEDICINE CLINIC | Age: 84
End: 2019-06-27
Payer: MEDICARE

## 2019-06-27 VITALS
TEMPERATURE: 97.7 F | RESPIRATION RATE: 16 BRPM | DIASTOLIC BLOOD PRESSURE: 80 MMHG | HEIGHT: 72 IN | SYSTOLIC BLOOD PRESSURE: 130 MMHG | HEART RATE: 61 BPM | OXYGEN SATURATION: 98 % | WEIGHT: 219.8 LBS | BODY MASS INDEX: 29.77 KG/M2

## 2019-06-27 DIAGNOSIS — R00.1 BRADYCARDIA: Primary | ICD-10-CM

## 2019-06-27 DIAGNOSIS — R42 DIZZINESS: ICD-10-CM

## 2019-06-27 PROCEDURE — 1036F TOBACCO NON-USER: CPT | Performed by: PHYSICIAN ASSISTANT

## 2019-06-27 PROCEDURE — 99213 OFFICE O/P EST LOW 20 MIN: CPT | Performed by: PHYSICIAN ASSISTANT

## 2019-06-27 PROCEDURE — G8427 DOCREV CUR MEDS BY ELIG CLIN: HCPCS | Performed by: PHYSICIAN ASSISTANT

## 2019-06-27 PROCEDURE — 1123F ACP DISCUSS/DSCN MKR DOCD: CPT | Performed by: PHYSICIAN ASSISTANT

## 2019-06-27 PROCEDURE — 4040F PNEUMOC VAC/ADMIN/RCVD: CPT | Performed by: PHYSICIAN ASSISTANT

## 2019-06-27 PROCEDURE — G8417 CALC BMI ABV UP PARAM F/U: HCPCS | Performed by: PHYSICIAN ASSISTANT

## 2019-06-27 NOTE — PROGRESS NOTES
2019  Hamlet Jose (: 1928)  [address] [facility]    Patient presents for follow up of dizziness. This is a chronic condition for the patient, has acutely worsened over the last few weeks. Patient states that after last visit, did better with increased hydration and eating more than one meal daily but has regressed. Now with dizziness with positional changes, although not always. Sometimes feels dizzy and \"faint\" while sitting and sometimes with exertion. Feels \"foggy\" during these episodes. No recent falls. Does have history of bradycardia. Daughter reports that during last hospitalization HR dropped to 32, patient felt similar at the time. Review of Systems   Constitutional: Negative for activity change, chills and fever. HENT: Negative for congestion, ear pain, rhinorrhea and sore throat. Eyes: Negative for visual disturbance. Respiratory: Positive for shortness of breath. Negative for cough. Cardiovascular: Negative for chest pain and palpitations. Gastrointestinal: Negative for abdominal pain, constipation, diarrhea, nausea and vomiting. Genitourinary: Negative for difficulty urinating and dysuria. Musculoskeletal: Negative for arthralgias and myalgias. Skin: Negative for rash. Neurological: Positive for dizziness, weakness and light-headedness. Negative for numbness. Psychiatric/Behavioral: Negative for sleep disturbance. Allergies, past medical history, family history, and social history reviewed and unchanged from previous encounter.      Current Outpatient Medications   Medication Sig Dispense Refill    olopatadine (PATANOL) 0.1 % ophthalmic solution Place 1 drop into the right eye 2 times daily 1 Bottle 0    citalopram (CELEXA) 10 MG tablet Take 10 mg by mouth daily      loratadine (KLS ALLERCLEAR) 10 MG tablet Take 10 mg by mouth daily      famotidine (PEPCID) 20 MG tablet TAKE 1 TABLET BY MOUTH TWICE A DAY AS NEEDED 180 tablet 1    Lift Chair MISC by Does not apply route 1 each 0    furosemide (LASIX) 40 MG tablet Take 1 tablet by mouth daily as needed (edema) 90 tablet 2    Misc. Devices ([facility]) MISC For use while walking 1 each 0    Lift Chair MISC by Does not apply route 1 each 0    ibuprofen (ADVIL;MOTRIN) 200 MG tablet Take 200 mg by mouth every 6 hours as needed for Pain        No current facility-administered medications for this visit. Vitals:    06/27/19 1511 06/27/19 1546 06/27/19 1547   BP: 124/80 122/76 130/80   Site: Left Upper Arm Left Upper Arm Right Upper Arm   Position: Sitting Standing Supine   Cuff Size: Medium Adult Medium Adult Medium Adult   Pulse: 61     Resp: 16     Temp: 97.7 °F (36.5 °C)     SpO2: 98%     Weight: 219 lb 12.8 oz (99.7 kg)     Height: 6' (1.829 m)       Estimated body mass index is 29.81 kg/m² as calculated from the following:    Height as of this encounter: 6' (1.829 m). Weight as of this encounter: 219 lb 12.8 oz (99.7 kg). Physical Exam   Constitutional: He is oriented to person, place, and time. He appears well-developed and well-nourished. No distress. HENT:   Head: Normocephalic and atraumatic. Eyes: Pupils are equal, round, and reactive to light. Conjunctivae and EOM are normal.   Neck: Neck supple. Cardiovascular: Regular rhythm and normal heart sounds. No murmur heard. Morena Hover   Pulmonary/Chest: Effort normal and breath sounds normal. He has no wheezes. Abdominal: Soft. Bowel sounds are normal. There is no tenderness. Musculoskeletal: He exhibits no edema. Lymphadenopathy:     He has no cervical adenopathy. Neurological: He is alert and oriented to person, place, and time. He has normal reflexes. Skin: Skin is warm and dry. No rash noted. Psychiatric: He has a normal mood and affect. ASSESSMENT and PLAN:  Livia Sparrow was seen today for dizziness, speech problem, shortness of breath, fatigue and bradycardia.     Diagnoses and all orders for this visit:    Bradycardia  -     Holter Monitor 24 Hour; Future  - Last ecg sinus daysi with variable rates. Recommend holter with likely cardiology follow up. Dizziness  -     Holter Monitor 24 Hour; Future  - Likely 2/2 to decreased po intake. Discussed adding remeron. Patient would prefer to try to increase po intake on his own. Discussed adding boost/ensure to meals. Also recommend eating more than one meal daily. If not improved in 1 month, would consider adding remeron. Return in about 1 month (around 7/25/2019) for decreased appetite.

## 2019-06-28 ENCOUNTER — TELEPHONE (OUTPATIENT)
Dept: FAMILY MEDICINE CLINIC | Age: 84
End: 2019-06-28

## 2019-06-30 ASSESSMENT — ENCOUNTER SYMPTOMS
COUGH: 0
DIARRHEA: 0
SORE THROAT: 0
CONSTIPATION: 0
NAUSEA: 0
VOMITING: 0
RHINORRHEA: 0
ABDOMINAL PAIN: 0
SHORTNESS OF BREATH: 1

## 2019-07-11 ENCOUNTER — TELEPHONE (OUTPATIENT)
Dept: FAMILY MEDICINE CLINIC | Age: 84
End: 2019-07-11

## 2019-07-12 ENCOUNTER — TELEPHONE (OUTPATIENT)
Dept: FAMILY MEDICINE CLINIC | Age: 84
End: 2019-07-12

## 2019-07-26 ENCOUNTER — OFFICE VISIT (OUTPATIENT)
Dept: FAMILY MEDICINE CLINIC | Age: 84
End: 2019-07-26
Payer: MEDICARE

## 2019-07-26 VITALS
HEART RATE: 65 BPM | RESPIRATION RATE: 16 BRPM | SYSTOLIC BLOOD PRESSURE: 128 MMHG | DIASTOLIC BLOOD PRESSURE: 68 MMHG | OXYGEN SATURATION: 95 %

## 2019-07-26 DIAGNOSIS — R42 DIZZINESS: Primary | ICD-10-CM

## 2019-07-26 DIAGNOSIS — R00.1 BRADYCARDIA: ICD-10-CM

## 2019-07-26 PROCEDURE — 1036F TOBACCO NON-USER: CPT | Performed by: PHYSICIAN ASSISTANT

## 2019-07-26 PROCEDURE — 99212 OFFICE O/P EST SF 10 MIN: CPT | Performed by: PHYSICIAN ASSISTANT

## 2019-07-26 PROCEDURE — G8427 DOCREV CUR MEDS BY ELIG CLIN: HCPCS | Performed by: PHYSICIAN ASSISTANT

## 2019-07-26 PROCEDURE — 1123F ACP DISCUSS/DSCN MKR DOCD: CPT | Performed by: PHYSICIAN ASSISTANT

## 2019-07-26 PROCEDURE — 4040F PNEUMOC VAC/ADMIN/RCVD: CPT | Performed by: PHYSICIAN ASSISTANT

## 2019-07-26 PROCEDURE — G8417 CALC BMI ABV UP PARAM F/U: HCPCS | Performed by: PHYSICIAN ASSISTANT

## 2019-07-26 RX ORDER — OMEPRAZOLE 20 MG/1
TABLET, DELAYED RELEASE ORAL
COMMUNITY
End: 2020-01-28

## 2019-07-26 NOTE — PROGRESS NOTES
 Misc. Devices ([facility]) MISC For use while walking 1 each 0    Lift Chair MISC by Does not apply route 1 each 0    ibuprofen (ADVIL;MOTRIN) 200 MG tablet Take 200 mg by mouth every 6 hours as needed for Pain        No current facility-administered medications for this visit. Vitals:    07/26/19 1507   BP: 128/68   Site: Left Upper Arm   Position: Sitting   Cuff Size: Medium Adult   Pulse: 65   Resp: 16   SpO2: 95%     Estimated body mass index is 29.81 kg/m² as calculated from the following:    Height as of 6/27/19: 6' (1.829 m). Weight as of 6/27/19: 219 lb 12.8 oz (99.7 kg). Physical Exam   Constitutional: He is oriented to person, place, and time. He appears well-developed and well-nourished. No distress. HENT:   Head: Normocephalic and atraumatic. Moist oral mucosa   Eyes: Pupils are equal, round, and reactive to light. Conjunctivae and EOM are normal.   Neck: Neck supple. Cardiovascular: Regular rhythm and normal heart sounds. No murmur heard. Valjean Selma   Pulmonary/Chest: Effort normal and breath sounds normal. He has no wheezes. Abdominal: Soft. Bowel sounds are normal. There is no tenderness. Musculoskeletal: He exhibits no edema. Lymphadenopathy:     He has no cervical adenopathy. Neurological: He is alert and oriented to person, place, and time. He has normal reflexes. Skin: Skin is warm and dry. No rash noted. Psychiatric: He has a normal mood and affect. ASSESSMENT and PLAN:  Rafa Paredes was seen today for 1 month follow-up. Diagnoses and all orders for this visit:    Dizziness  - Improved with increased po intake. Do not feel that remeron is indicated at this time. - Patient to continue at least 2 meals daily with increased hydration and boosts (1-2 daily. )   - Continue PT as ordered     Bradycardia  - Patient asymptomatic at this time. Has orders for holter if sxs return    No follow-ups on file.

## 2019-07-28 ASSESSMENT — ENCOUNTER SYMPTOMS
ABDOMINAL PAIN: 0
RHINORRHEA: 0
DIARRHEA: 0
CONSTIPATION: 0
SHORTNESS OF BREATH: 0
VOMITING: 0
NAUSEA: 0
COUGH: 0
SORE THROAT: 0

## 2019-08-26 RX ORDER — OLOPATADINE HYDROCHLORIDE 1 MG/ML
SOLUTION/ DROPS OPHTHALMIC
Qty: 5 ML | Refills: 2 | Status: SHIPPED | OUTPATIENT
Start: 2019-08-26 | End: 2019-09-20 | Stop reason: SDUPTHER

## 2019-08-30 RX ORDER — FUROSEMIDE 40 MG/1
40 TABLET ORAL DAILY PRN
Qty: 90 TABLET | Refills: 2 | Status: SHIPPED | OUTPATIENT
Start: 2019-08-30 | End: 2019-12-09 | Stop reason: SDUPTHER

## 2019-10-01 ENCOUNTER — TELEPHONE (OUTPATIENT)
Dept: FAMILY MEDICINE CLINIC | Age: 84
End: 2019-10-01

## 2019-10-01 RX ORDER — FAMOTIDINE 20 MG/1
TABLET, FILM COATED ORAL
Qty: 180 TABLET | Refills: 1 | Status: SHIPPED | OUTPATIENT
Start: 2019-10-01 | End: 2020-01-01

## 2019-12-10 RX ORDER — FUROSEMIDE 40 MG/1
40 TABLET ORAL DAILY PRN
Qty: 90 TABLET | Refills: 2 | Status: SHIPPED | OUTPATIENT
Start: 2019-12-10

## 2020-01-01 ENCOUNTER — TELEPHONE (OUTPATIENT)
Dept: FAMILY MEDICINE CLINIC | Age: 85
End: 2020-01-01

## 2020-01-01 ENCOUNTER — HOSPITAL ENCOUNTER (OUTPATIENT)
Dept: NON INVASIVE DIAGNOSTICS | Age: 85
Discharge: HOME | End: 2020-02-13
Payer: MEDICARE

## 2020-01-01 ENCOUNTER — CARE COORDINATION (OUTPATIENT)
Dept: CARE COORDINATION | Age: 85
End: 2020-01-01

## 2020-01-01 LAB
ACQUISITION DURATION: NORMAL S
AVERAGE HEART RATE: 59 BPM
EKG DIAGNOSIS: NORMAL
FASTEST SUPRAVENTRICULAR RATE: 127 BPM
HOLTER MAX HEART RATE: 77 BPM
HOOKUP DATE: NORMAL
HOOKUP TIME: NORMAL
LONGEST SUPRAVENTRICULAR RATE: 90 BPM
MAX HEART RATE TIME/DATE: NORMAL
MIN HEART RATE TIME/DATE: NORMAL
MIN HEART RATE: 47 BPM
NUMBER OF FASTEST SUPRAVENTRICULAR BEATS: 4
NUMBER OF LONGEST SUPRAVENTRICULAR BEATS: 34
NUMBER OF QRS COMPLEXES: NORMAL
NUMBER OF SUPRAVENTRICULAR BEATS IN RUNS: 138
NUMBER OF SUPRAVENTRICULAR COUPLETS: 419
NUMBER OF SUPRAVENTRICULAR ECTOPICS: 7350
NUMBER OF SUPRAVENTRICULAR ISOLATED BEATS: 6374
NUMBER OF SUPRAVENTRICULAR RUNS: 20
NUMBER OF VENTRICULAR BEATS IN RUNS: 0
NUMBER OF VENTRICULAR BIGEMINAL CYCLES: 8
NUMBER OF VENTRICULAR COUPLETS: 0
NUMBER OF VENTRICULAR ECTOPICS: 156
NUMBER OF VENTRICULAR ISOLATED BEATS: 156
NUMBER OF VENTRICULAR RUNS: 0

## 2020-01-01 PROCEDURE — 93225 XTRNL ECG REC<48 HRS REC: CPT

## 2020-01-01 PROCEDURE — 93226 XTRNL ECG REC<48 HR SCAN A/R: CPT

## 2020-01-01 RX ORDER — CITALOPRAM 10 MG/1
TABLET ORAL
Qty: 90 TABLET | Refills: 1 | Status: SHIPPED | OUTPATIENT
Start: 2020-01-01

## 2020-01-01 RX ORDER — HYDROCODONE BITARTRATE AND ACETAMINOPHEN 5; 325 MG/1; MG/1
TABLET ORAL
COMMUNITY
Start: 2020-01-01

## 2020-01-01 RX ORDER — FAMOTIDINE 20 MG/1
TABLET, FILM COATED ORAL
Qty: 180 TABLET | Refills: 1 | Status: SHIPPED | OUTPATIENT
Start: 2020-01-01

## 2020-01-28 ENCOUNTER — OFFICE VISIT (OUTPATIENT)
Dept: FAMILY MEDICINE CLINIC | Age: 85
End: 2020-01-28
Payer: MEDICARE

## 2020-01-28 VITALS
DIASTOLIC BLOOD PRESSURE: 80 MMHG | HEIGHT: 72 IN | HEART RATE: 63 BPM | WEIGHT: 212.6 LBS | OXYGEN SATURATION: 96 % | SYSTOLIC BLOOD PRESSURE: 138 MMHG | BODY MASS INDEX: 28.79 KG/M2

## 2020-01-28 LAB
A/G RATIO: 2 (ref 1.1–2.2)
ALBUMIN SERPL-MCNC: 4 G/DL (ref 3.4–5)
ALP BLD-CCNC: 102 U/L (ref 40–129)
ALT SERPL-CCNC: 12 U/L (ref 10–40)
ANION GAP SERPL CALCULATED.3IONS-SCNC: 14 MMOL/L (ref 3–16)
AST SERPL-CCNC: 18 U/L (ref 15–37)
BASOPHILS ABSOLUTE: 0.1 K/UL (ref 0–0.2)
BASOPHILS RELATIVE PERCENT: 0.8 %
BILIRUB SERPL-MCNC: 0.6 MG/DL (ref 0–1)
BUN BLDV-MCNC: 18 MG/DL (ref 7–20)
CALCIUM SERPL-MCNC: 9.7 MG/DL (ref 8.3–10.6)
CHLORIDE BLD-SCNC: 102 MMOL/L (ref 99–110)
CO2: 28 MMOL/L (ref 21–32)
CREAT SERPL-MCNC: 0.8 MG/DL (ref 0.8–1.3)
EOSINOPHILS ABSOLUTE: 0.1 K/UL (ref 0–0.6)
EOSINOPHILS RELATIVE PERCENT: 2.1 %
GFR AFRICAN AMERICAN: >60
GFR NON-AFRICAN AMERICAN: >60
GLOBULIN: 2 G/DL
GLUCOSE BLD-MCNC: 91 MG/DL (ref 70–99)
HCT VFR BLD CALC: 37.9 % (ref 40.5–52.5)
HEMOGLOBIN: 12.6 G/DL (ref 13.5–17.5)
LYMPHOCYTES ABSOLUTE: 2.6 K/UL (ref 1–5.1)
LYMPHOCYTES RELATIVE PERCENT: 37 %
MCH RBC QN AUTO: 31.4 PG (ref 26–34)
MCHC RBC AUTO-ENTMCNC: 33.2 G/DL (ref 31–36)
MCV RBC AUTO: 94.6 FL (ref 80–100)
MONOCYTES ABSOLUTE: 0.5 K/UL (ref 0–1.3)
MONOCYTES RELATIVE PERCENT: 7.8 %
NEUTROPHILS ABSOLUTE: 3.6 K/UL (ref 1.7–7.7)
NEUTROPHILS RELATIVE PERCENT: 52.3 %
PDW BLD-RTO: 15 % (ref 12.4–15.4)
PLATELET # BLD: 233 K/UL (ref 135–450)
PMV BLD AUTO: 8 FL (ref 5–10.5)
POTASSIUM SERPL-SCNC: 4.1 MMOL/L (ref 3.5–5.1)
RBC # BLD: 4 M/UL (ref 4.2–5.9)
SODIUM BLD-SCNC: 144 MMOL/L (ref 136–145)
TOTAL PROTEIN: 6 G/DL (ref 6.4–8.2)
WBC # BLD: 6.9 K/UL (ref 4–11)

## 2020-01-28 PROCEDURE — 93000 ELECTROCARDIOGRAM COMPLETE: CPT | Performed by: FAMILY MEDICINE

## 2020-01-28 PROCEDURE — 1036F TOBACCO NON-USER: CPT | Performed by: FAMILY MEDICINE

## 2020-01-28 PROCEDURE — G8427 DOCREV CUR MEDS BY ELIG CLIN: HCPCS | Performed by: FAMILY MEDICINE

## 2020-01-28 PROCEDURE — 1123F ACP DISCUSS/DSCN MKR DOCD: CPT | Performed by: FAMILY MEDICINE

## 2020-01-28 PROCEDURE — G8484 FLU IMMUNIZE NO ADMIN: HCPCS | Performed by: FAMILY MEDICINE

## 2020-01-28 PROCEDURE — G8417 CALC BMI ABV UP PARAM F/U: HCPCS | Performed by: FAMILY MEDICINE

## 2020-01-28 PROCEDURE — 99214 OFFICE O/P EST MOD 30 MIN: CPT | Performed by: FAMILY MEDICINE

## 2020-01-28 PROCEDURE — 4040F PNEUMOC VAC/ADMIN/RCVD: CPT | Performed by: FAMILY MEDICINE

## 2020-01-28 ASSESSMENT — PATIENT HEALTH QUESTIONNAIRE - PHQ9
SUM OF ALL RESPONSES TO PHQ9 QUESTIONS 1 & 2: 0
SUM OF ALL RESPONSES TO PHQ QUESTIONS 1-9: 0
2. FEELING DOWN, DEPRESSED OR HOPELESS: 0
1. LITTLE INTEREST OR PLEASURE IN DOING THINGS: 0
SUM OF ALL RESPONSES TO PHQ QUESTIONS 1-9: 0

## 2020-01-28 NOTE — PROGRESS NOTES
time.  Conservative therapy: N/A.     Planned anesthesia: IV sedation  Known anesthesia problems: None   Bleeding risk: No recent or remote history of abnormal bleeding  Personal or FH of DVT/PE: No    Patient objection to receiving blood products: No    Patient Active Problem List   Diagnosis    GERD (gastroesophageal reflux disease)    Benign prostatic hyperplasia    Osteoarthritis    Hypercholesterolemia    CLL [address]chronic lymphocytic leukemia) ([address]    Scoliosis    Diverticulosis    Lumbar spinal stenosis    Hypertension    Chronic back pain    Left hip pain    Heartburn    Elevated glucose    S/P colostomy ([address]    Enthesopathy of hip region    Primary localized osteoarthrosis, lower leg    Primary localized osteoarthrosis, pelvic region and thigh    Chemotherapy-induced neuropathy ([address]    Personal history of rectal cancer    Secondary malignancy of right lung ([address]    Rectal carcinoma ([address]       Past Medical History:   Diagnosis Date    Anemia     fe    Anesthesia     slow to wake up    Back pain, chronic     BPH     Carbon monoxide poisoning 4/25/2013    CLL (chronic lymphocytic leukemia) (HCC)     daughter denies    CLL (chronic lymphocytic leukemia) ([address]     Colon polyps 12/3/2007    tubular and adenomatous    Colostomy in place ([address]     Depression     Diverticulosis     GERD (gastroesophageal reflux disease)     Hematoma     [place] (hard of hearing)     Hypercholesterolemia     no meds    Lung nodules     Osteoarthritis     Prolonged emergence from general anesthesia     Rectal cancer ([address] 2012    chemo & radiation    Scoliosis     Spinal stenosis     Stress fracture of femoral neck 6/30/09    left, also stress fracture of femoral head    Stress fracture of femoral neck 6/30/2009    left, also stress fracture of femoral head    Urinary retention      Past Surgical History:   Procedure Laterality Date    BACK SURGERY  6/9/10    L3,4,5    BRAIN SURGERY  2013 hematoma from fall drained    COLONOSCOPY  1/14/11    COLONOSCOPY  7/30/12    polyp    COLONOSCOPY  9/13    no specimens    FINGER AMPUTATION      left index finger    HIP SURGERY  11-9-11    LEFT TOTAL HIP REPLACEMENT, ANTERIOR APPROACH WITH CELLSAVER  AND PLATELET GEL DEPUY    INTRACAPSULAR CATARACT EXTRACTION Right 6/17/2019    PHACOEMULSIFICATION OF CATARACT RIGHT EYE WITH INTRAOCULAR LENS IMPLANT performed by Janey Brown MD at [facility] Left 6/24/2019    PHACOEMULSIFICATION OF CATARACT LEFT EYE WITH INTRAOCULAR LENS IMPLANT performed by Janey Brown MD at [facility]  11-    Exploratory laparotomy, lower sigmoid rectal and anal resection, appendectomy and creation of end colostomy    OTHER SURGICAL HISTORY  08/17/12    rectal EUS    OTHER SURGICAL HISTORY  8/29/2012    POWER PORT INSERTION    TURP       Family History   Problem Relation Age of Onset    Cancer Brother         esophageal    Cancer Brother         LUNG     Social History     Socioeconomic History    Marital status:       Spouse name: Not on file    Number of children: Not on file    Years of education: Not on file    Highest education level: Not on file   Occupational History    Not on file   Social Needs    Financial resource strain: Not on file    Food insecurity:     Worry: Not on file     Inability: Not on file    Transportation needs:     Medical: Not on file     Non-medical: Not on file   Tobacco Use    Smoking status: Never Smoker    Smokeless tobacco: Never Used   Substance and Sexual Activity    Alcohol use: No    Drug use: No    Sexual activity: Never   Lifestyle    Physical activity:     Days per week: Not on file     Minutes per session: Not on file    Stress: Not on file   Relationships    Social connections:     Talks on phone: Not on file     Gets together: Not on file     Attends Anabaptism service: Not on file     Active member of club or organization: Not on file     Attends meetings of clubs or organizations: Not on file     Relationship status: Not on file    Intimate partner violence:     Fear of current or ex partner: Not on file     Emotionally abused: Not on file     Physically abused: Not on file     Forced sexual activity: Not on file   Other Topics Concern    Not on file   Social History Narrative    Not on file       Review of Systems  A comprehensive review of systems was negative except for what was noted in the HPI. Physical Exam   Constitutional: He is oriented to person, place, and time. He appears well-developed and well-nourished. No distress. HENT:   Head: Normocephalic and atraumatic. Mouth/Throat: Uvula is midline, oropharynx is clear and moist and mucous membranes are normal.   Eyes: Conjunctivae and EOM are normal. Pupils are equal, round, and reactive to light. Neck: Trachea normal and normal range of motion. Neck supple. No JVD present. Carotid bruit is not present. No mass and no thyromegaly present. Cardiovascular: Normal rate, regular rhythm with few extrasystoles noted about every 6-7th beat, normal heart sounds and intact distal pulses otherwise. Exam reveals no gallop and no friction rub. No murmur heard. Pulmonary/Chest: Effort normal and breath sounds normal. No respiratory distress. He has no wheezes. He has no rales. Abdominal: Soft. Normal aorta and bowel sounds are normal. He exhibits no distension. He has left lower colostomy with a large hernia noted at ostomy site. There is no hepatosplenomegaly. No tenderness. Musculoskeletal: He exhibits no tenderness. He has 1+ bilateral pitting edema. Neurological: He is alert and oriented to person, place, and time. He has normal strength. No cranial nerve deficit or sensory deficit. Coordination and gait normal.   Skin: Skin is warm and dry. No rash noted. No erythema.    Psychiatric: He has a normal mood and affect. His behavior is normal.     EKG Interpretation:  PAC's noted, old inferior infarct, left axis, LAFB, unchanged from previous tracings. Lab Review   No visits with results within 6 Month(s) from this visit. Latest known visit with results is:   Orders Only on 06/04/2019   Component Date Value    WBC 06/04/2019 7.1     RBC 06/04/2019 4.09*    Hemoglobin 06/04/2019 13.0*    Hematocrit 06/04/2019 39.3*    MCV 06/04/2019 96.1     MCH 06/04/2019 31.7     MCHC 06/04/2019 33.0     RDW 06/04/2019 15.2     Platelets 85/10/3056 200     MPV 06/04/2019 7.9     Neutrophils % 06/04/2019 53.8     Lymphocytes % 06/04/2019 38.3     Monocytes % 06/04/2019 5.7     Eosinophils % 06/04/2019 1.8     Basophils % 06/04/2019 0.4     Neutrophils Absolute 06/04/2019 3.8     Lymphocytes Absolute 06/04/2019 2.7     Monocytes Absolute 06/04/2019 0.4     Eosinophils Absolute 06/04/2019 0.1     Basophils Absolute 06/04/2019 0.0          Assessment:       80 y.o. patient with planned surgery as above. Known risk factors for perioperative complications: Hypertension, CLL, mild anemia of chronic disease, GERD  Current medications which may produce withdrawal symptoms if withheld perioperatively: none      Plan:     1. Preoperative workup as follows: labs per orders. 2. Change in medication regimen before surgery: Discontinue NSAIDs (Advil) 7 days before surgery  3.  Prophylaxis for cardiac events with perioperative beta-blockers: Not indicated  ACC/AHA indications for pre-operative beta-blocker use:    · Vascular surgery with history of postitive stress test  · Intermediate or high risk surgery with history of CAD   · Intermediate or high risk surgery with multiple clinical predictors of CAD- 2 of the following: history of compensated or prior heart failure, history of cerebrovascular disease, DM, or renal insufficiency    Routine administration of higher-dose, long-acting metoprolol in beta-blocker-naïve patients

## 2020-09-08 NOTE — TELEPHONE ENCOUNTER
Dr. Berto Puentes  [facility] [place] [address]   Phone: [phone]

## 2020-09-08 NOTE — TELEPHONE ENCOUNTER
----- Message from Corina Stein sent at 9/8/2020 10:38 AM EDT -----  Subject: Message to Provider    QUESTIONS  Information for Provider? Pt is calling and looking for an Geriatrics   doctor. Pt has been falling recently   ---------------------------------------------------------------------------  --------------  CALL BACK INFO  What is the best way for the office to contact you? OK to leave message on   voicemail  Preferred Call Back Phone Number? [phone]  ---------------------------------------------------------------------------  --------------  SCRIPT ANSWERS  Relationship to Patient? Guardian  Representative Name? Elsal Abdirashid  Additional information verified (besides Name and Date of Birth)? Address  Appointment reason? Symptomatic  Select script based on patient symptoms? Adult No Script  (Patient requests to see the provider urgently  today or tomorrow. )? No  (Is the patient requesting to see the provider for a procedure?)? No  (Is the patient requesting to be seen routinely (not today or tomorrow)?    No

## 2020-09-15 NOTE — TELEPHONE ENCOUNTER
[facility] calling to report they have been contacted by the patients daughters asking for assistance to get their dad in home skilled nursing care. [facility] [employer] asking if you will do this. Please contact Slim Gregg at [phone] to advise if you will do so.

## 2020-09-18 NOTE — TELEPHONE ENCOUNTER
That can be up to her. If she'd like, I'm happy to check him out and see if there is anything we can figure out that might be making him weaker. We might be able to help some if that is the case.

## 2020-09-18 NOTE — TELEPHONE ENCOUNTER
Hi Dr. Vishal Edgar,     I spoke with pt's daughter Aleksandr De Jesus and she states that they are getting things organized for her father to go to LTC at [facility]. She reports however this maybe a 1-2 month process depending on bed availability at the LTC. She has gotten him home making, meals, and sitter services through [employer]. Her concerns at this time are that her father has gotten weaker and has had a couple of falls recently. She wanted to know if you would like for them to schedule an appointment?     Thanks,   Dexter Bledsoe

## 2020-09-18 NOTE — CARE COORDINATION
ACM had received a request from PCP to follow up on patient's daughter's request with assistance in placement in a LTC facility. Spoke with pt's daughter Dinh Arriola and they are in the process of getting on the wait list for [facility]. Dinh Arevaloanne states that it may take 1-2 months depending on bed availability. She states that her father has agreed to programs with [employer] including homemaking, sitter, and meal services. Jenna's may concern is if her dad needs an appointment with PCP d/t change in strength. Dinh Flako reports he is weaker and has fallen a couple of times. ACM will send a message to PCP asking if she would like for them to schedule an appointment. Message to PCP will be on telephone note from 9/15/20.

## 2020-09-21 NOTE — TELEPHONE ENCOUNTER
Outreach call made to pt's daughter Everardo Salinas and instructed her on Dr Olivares's message that it would be up to her and her sister if they would like to schedule an appointment and if they choose to schedule an appointment, Dr. Sendy Lugo would be happy to see him. Also provided ACM contact information for pt to call ACM if needed.

## 2020-09-30 NOTE — TELEPHONE ENCOUNTER
.  Last office visit 1/28/2020     Last written 4-2-2020 90 with 1      Next office visit scheduled Visit date not found    Requested Prescriptions     Pending Prescriptions Disp Refills    citalopram (CELEXA) 10 MG tablet [Pharmacy Med Name: CITALOPRAM HBR 10 MG TABLET] 90 tablet 1     Sig: TAKE 1 TABLET BY MOUTH EVERY DAY

## 2020-10-08 NOTE — TELEPHONE ENCOUNTER
Vickie called because pt is in hospice-terminal rectal ca with [facility]. Arrived 10/1/20. They would like to know if you want to remain his attending physician. Their CNP is asking for an order for Senna + and a comfort pac.

## 2020-10-20 NOTE — TELEPHONE ENCOUNTER
Simón lerma called from [employer] on [address] and was asking if we got the nursing home placement papers for the pt that was sent on 10/12. I let her know that Dr. DÍAZSDEN [facility] has been out of the office and will not be back in till Thursday and she is asking if there is another doctor that would be able to sign the papers and fax them back to them. Please Advise if any questions.  [phone]

## (undated) DEVICE — SET GRAV VENT NVENT CK VLV 3 NDL FREE PRT 10 GTT

## (undated) DEVICE — SOLUTION IV 1000ML LAC RINGERS PH 6.5 INJ USP VIAFLX PLAS

## (undated) DEVICE — GLOVE SURG SZ 65 L12IN FNGR THK94MIL STD WHT LTX FREE

## (undated) DEVICE — CATHETER IV 20GA L1.25IN PNK FEP SFTY STR HUB RADPQ DISP

## (undated) DEVICE — Device

## (undated) DEVICE — PATIENT CARE KIT EYE POST OP

## (undated) DEVICE — GLOVE,SURG,SENSICARE,ALOE,LF,PF,7: Brand: MEDLINE

## (undated) DEVICE — Device: Brand: MALYUGIN RING SYSTEM 7.0MM

## (undated) DEVICE — GLOVE SURG SZ 75 L12IN THK75MIL DK GRN LTX FREE

## (undated) DEVICE — AIRLIFE™ NASAL OXYGEN CANNULA CURVED, FLARED TIP, WITH 7 FEET (2.1 M) CRUSH RESISTANT TUBING, OVER-THE-EAR STYLE: Brand: AIRLIFE™

## (undated) DEVICE — GLOVE SURG SZ 75 L12IN FNGR THK94MIL STD WHT LTX FREE

## (undated) DEVICE — SET ADMIN PRIMING 7ML L30IN 7.35LB 20 GTT 2ND RLER CLMP

## (undated) DEVICE — SOLUTION IRRIG 250ML STRL H2O PLAS POUR BTL USP

## (undated) DEVICE — SYRINGE TB 1ML NDL 27GA L0.5IN PLAS SLIP TIP CONVENTIONAL

## (undated) DEVICE — SILICONE I/A TIP STRAIGHT: Brand: ALCON

## (undated) DEVICE — SURGICAL PROCEDURE PACK EYE ANDRSN

## (undated) DEVICE — ELECTRODE ECG MONITR FOAM TEAR DROP ADLT RED

## (undated) DEVICE — 3M™ TEGADERM™ TRANSPARENT FILM DRESSING FRAME STYLE, 1624W, 2-3/8 IN X 2-3/4 IN (6 CM X 7 CM), 100/CT 4CT/CASE: Brand: 3M™ TEGADERM™

## (undated) DEVICE — MICROSURGICAL INSTRUMENT ANTERIOR CHAMBER CANNULA 30GA: Brand: ALCON

## (undated) DEVICE — FILTER NEEDLE: Brand: MONOJECT